# Patient Record
Sex: MALE | Race: WHITE | NOT HISPANIC OR LATINO | Employment: FULL TIME | URBAN - METROPOLITAN AREA
[De-identification: names, ages, dates, MRNs, and addresses within clinical notes are randomized per-mention and may not be internally consistent; named-entity substitution may affect disease eponyms.]

---

## 2017-06-09 ENCOUNTER — HOSPITAL ENCOUNTER (EMERGENCY)
Facility: CLINIC | Age: 62
Discharge: ANOTHER HOSPITAL | End: 2017-06-10
Attending: EMERGENCY MEDICINE | Admitting: EMERGENCY MEDICINE
Payer: COMMERCIAL

## 2017-06-09 DIAGNOSIS — I21.4 NSTEMI (NON-ST ELEVATED MYOCARDIAL INFARCTION) (H): ICD-10-CM

## 2017-06-09 LAB
ALBUMIN SERPL-MCNC: 3.3 G/DL (ref 3.4–5)
ALP SERPL-CCNC: 82 U/L (ref 40–150)
ALT SERPL W P-5'-P-CCNC: 20 U/L (ref 0–70)
ANION GAP SERPL CALCULATED.3IONS-SCNC: 10 MMOL/L (ref 3–14)
AST SERPL W P-5'-P-CCNC: 18 U/L (ref 0–45)
BASOPHILS # BLD AUTO: 0 10E9/L (ref 0–0.2)
BASOPHILS NFR BLD AUTO: 0.6 %
BILIRUB SERPL-MCNC: 0.7 MG/DL (ref 0.2–1.3)
BUN SERPL-MCNC: 33 MG/DL (ref 7–30)
CALCIUM SERPL-MCNC: 8.3 MG/DL (ref 8.5–10.1)
CHLORIDE SERPL-SCNC: 109 MMOL/L (ref 94–109)
CO2 SERPL-SCNC: 23 MMOL/L (ref 20–32)
CREAT SERPL-MCNC: 0.96 MG/DL (ref 0.66–1.25)
DIFFERENTIAL METHOD BLD: NORMAL
EOSINOPHIL # BLD AUTO: 0.4 10E9/L (ref 0–0.7)
EOSINOPHIL NFR BLD AUTO: 7.8 %
ERYTHROCYTE [DISTWIDTH] IN BLOOD BY AUTOMATED COUNT: 12.5 % (ref 10–15)
GFR SERPL CREATININE-BSD FRML MDRD: 80 ML/MIN/1.7M2
GLUCOSE SERPL-MCNC: 115 MG/DL (ref 70–99)
HCT VFR BLD AUTO: 40.1 % (ref 40–53)
HGB BLD-MCNC: 13.7 G/DL (ref 13.3–17.7)
IMM GRANULOCYTES # BLD: 0 10E9/L (ref 0–0.4)
IMM GRANULOCYTES NFR BLD: 0 %
LYMPHOCYTES # BLD AUTO: 2.2 10E9/L (ref 0.8–5.3)
LYMPHOCYTES NFR BLD AUTO: 41 %
MCH RBC QN AUTO: 30.4 PG (ref 26.5–33)
MCHC RBC AUTO-ENTMCNC: 34.2 G/DL (ref 31.5–36.5)
MCV RBC AUTO: 89 FL (ref 78–100)
MONOCYTES # BLD AUTO: 0.6 10E9/L (ref 0–1.3)
MONOCYTES NFR BLD AUTO: 11 %
NEUTROPHILS # BLD AUTO: 2.1 10E9/L (ref 1.6–8.3)
NEUTROPHILS NFR BLD AUTO: 39.6 %
PLATELET # BLD AUTO: 169 10E9/L (ref 150–450)
POTASSIUM SERPL-SCNC: 4.1 MMOL/L (ref 3.4–5.3)
PROT SERPL-MCNC: 6.9 G/DL (ref 6.8–8.8)
RBC # BLD AUTO: 4.51 10E12/L (ref 4.4–5.9)
SODIUM SERPL-SCNC: 142 MMOL/L (ref 133–144)
TROPONIN I SERPL-MCNC: 0.03 UG/L (ref 0–0.04)
WBC # BLD AUTO: 5.3 10E9/L (ref 4–11)

## 2017-06-09 PROCEDURE — 93005 ELECTROCARDIOGRAM TRACING: CPT | Mod: 76

## 2017-06-09 PROCEDURE — 93005 ELECTROCARDIOGRAM TRACING: CPT

## 2017-06-09 PROCEDURE — 96365 THER/PROPH/DIAG IV INF INIT: CPT

## 2017-06-09 PROCEDURE — 99285 EMERGENCY DEPT VISIT HI MDM: CPT

## 2017-06-09 PROCEDURE — 80053 COMPREHEN METABOLIC PANEL: CPT | Performed by: EMERGENCY MEDICINE

## 2017-06-09 PROCEDURE — 99285 EMERGENCY DEPT VISIT HI MDM: CPT | Mod: 25 | Performed by: EMERGENCY MEDICINE

## 2017-06-09 PROCEDURE — 25000132 ZZH RX MED GY IP 250 OP 250 PS 637: Performed by: EMERGENCY MEDICINE

## 2017-06-09 PROCEDURE — 93010 ELECTROCARDIOGRAM REPORT: CPT | Performed by: EMERGENCY MEDICINE

## 2017-06-09 PROCEDURE — 85025 COMPLETE CBC W/AUTO DIFF WBC: CPT | Performed by: EMERGENCY MEDICINE

## 2017-06-09 PROCEDURE — 84484 ASSAY OF TROPONIN QUANT: CPT | Performed by: EMERGENCY MEDICINE

## 2017-06-09 RX ORDER — ASPIRIN 81 MG/1
324 TABLET, CHEWABLE ORAL ONCE
Status: COMPLETED | OUTPATIENT
Start: 2017-06-09 | End: 2017-06-09

## 2017-06-09 RX ADMIN — ASPIRIN 81 MG 324 MG: 81 TABLET ORAL at 23:01

## 2017-06-10 ENCOUNTER — HOSPITAL ENCOUNTER (INPATIENT)
Facility: CLINIC | Age: 62
LOS: 1 days | Discharge: HOME | DRG: 247 | End: 2017-06-10
Attending: INTERNAL MEDICINE | Admitting: INTERNAL MEDICINE
Payer: COMMERCIAL

## 2017-06-10 ENCOUNTER — APPOINTMENT (OUTPATIENT)
Dept: CARDIOLOGY | Facility: CLINIC | Age: 62
DRG: 247 | End: 2017-06-10
Payer: COMMERCIAL

## 2017-06-10 VITALS
HEART RATE: 72 BPM | RESPIRATION RATE: 17 BRPM | OXYGEN SATURATION: 94 % | DIASTOLIC BLOOD PRESSURE: 77 MMHG | SYSTOLIC BLOOD PRESSURE: 117 MMHG | HEIGHT: 72 IN | WEIGHT: 205 LBS | BODY MASS INDEX: 27.77 KG/M2

## 2017-06-10 VITALS
SYSTOLIC BLOOD PRESSURE: 106 MMHG | OXYGEN SATURATION: 98 % | HEIGHT: 72 IN | HEART RATE: 52 BPM | DIASTOLIC BLOOD PRESSURE: 77 MMHG | WEIGHT: 212 LBS | TEMPERATURE: 98 F | BODY MASS INDEX: 28.71 KG/M2 | RESPIRATION RATE: 16 BRPM

## 2017-06-10 DIAGNOSIS — I24.9 ACS (ACUTE CORONARY SYNDROME) (H): Primary | ICD-10-CM

## 2017-06-10 LAB
CHOLEST SERPL-MCNC: 191 MG/DL
HBA1C MFR BLD: 5.7 % (ref 4.3–6)
HDLC SERPL-MCNC: 50 MG/DL
HGB BLD-MCNC: 13.9 G/DL (ref 13.3–17.7)
KCT BLD-ACNC: 238 SEC (ref 105–167)
LDLC SERPL CALC-MCNC: 116 MG/DL
LMWH PPP CHRO-ACNC: 1.29 IU/ML
NONHDLC SERPL-MCNC: 141 MG/DL
TRIGL SERPL-MCNC: 124 MG/DL
TROPONIN I SERPL-MCNC: 0.1 UG/L (ref 0–0.04)
TROPONIN I SERPL-MCNC: 0.39 UG/L (ref 0–0.04)
TROPONIN I SERPL-MCNC: 0.52 UG/L (ref 0–0.04)

## 2017-06-10 PROCEDURE — 25000125 ZZHC RX 250: Performed by: INTERNAL MEDICINE

## 2017-06-10 PROCEDURE — 85347 COAGULATION TIME ACTIVATED: CPT

## 2017-06-10 PROCEDURE — 80061 LIPID PANEL: CPT | Performed by: INTERNAL MEDICINE

## 2017-06-10 PROCEDURE — 83036 HEMOGLOBIN GLYCOSYLATED A1C: CPT | Performed by: INTERNAL MEDICINE

## 2017-06-10 PROCEDURE — 93005 ELECTROCARDIOGRAM TRACING: CPT

## 2017-06-10 PROCEDURE — 96365 THER/PROPH/DIAG IV INF INIT: CPT

## 2017-06-10 PROCEDURE — 25000132 ZZH RX MED GY IP 250 OP 250 PS 637

## 2017-06-10 PROCEDURE — 36415 COLL VENOUS BLD VENIPUNCTURE: CPT | Performed by: INTERNAL MEDICINE

## 2017-06-10 PROCEDURE — 85520 HEPARIN ASSAY: CPT | Performed by: INTERNAL MEDICINE

## 2017-06-10 PROCEDURE — 27210946 ZZH KIT HC TOTES DISP CR8

## 2017-06-10 PROCEDURE — C9600 PERC DRUG-EL COR STENT SING: HCPCS

## 2017-06-10 PROCEDURE — 27210998 ZZH ACCESS HEART CATH CR6

## 2017-06-10 PROCEDURE — 85018 HEMOGLOBIN: CPT

## 2017-06-10 PROCEDURE — 25000128 H RX IP 250 OP 636: Performed by: INTERNAL MEDICINE

## 2017-06-10 PROCEDURE — 84484 ASSAY OF TROPONIN QUANT: CPT | Performed by: INTERNAL MEDICINE

## 2017-06-10 PROCEDURE — 25000132 ZZH RX MED GY IP 250 OP 250 PS 637: Performed by: INTERNAL MEDICINE

## 2017-06-10 PROCEDURE — 36415 COLL VENOUS BLD VENIPUNCTURE: CPT

## 2017-06-10 PROCEDURE — C1874 STENT, COATED/COV W/DEL SYS: HCPCS

## 2017-06-10 PROCEDURE — 84484 ASSAY OF TROPONIN QUANT: CPT | Performed by: EMERGENCY MEDICINE

## 2017-06-10 PROCEDURE — 21400006 ZZH R&B CCU INTERMEDIATE UMMC

## 2017-06-10 PROCEDURE — 92941 PRQ TRLML REVSC TOT OCCL AMI: CPT | Mod: LD | Performed by: INTERNAL MEDICINE

## 2017-06-10 PROCEDURE — 93454 CORONARY ARTERY ANGIO S&I: CPT | Mod: 26 | Performed by: INTERNAL MEDICINE

## 2017-06-10 PROCEDURE — 27210787 ZZH MANIFOLD CR2

## 2017-06-10 PROCEDURE — C1894 INTRO/SHEATH, NON-LASER: HCPCS

## 2017-06-10 PROCEDURE — C1760 CLOSURE DEV, VASC: HCPCS

## 2017-06-10 PROCEDURE — 99223 1ST HOSP IP/OBS HIGH 75: CPT | Mod: AI | Performed by: INTERNAL MEDICINE

## 2017-06-10 PROCEDURE — 27210760 ZZH DEVICE INFLATION CR7

## 2017-06-10 PROCEDURE — 027034Z DILATION OF CORONARY ARTERY, ONE ARTERY WITH DRUG-ELUTING INTRALUMINAL DEVICE, PERCUTANEOUS APPROACH: ICD-10-PCS | Performed by: INTERNAL MEDICINE

## 2017-06-10 PROCEDURE — 27210742 ZZH CATH CR1

## 2017-06-10 PROCEDURE — C1887 CATHETER, GUIDING: HCPCS

## 2017-06-10 PROCEDURE — 93010 ELECTROCARDIOGRAM REPORT: CPT | Performed by: INTERNAL MEDICINE

## 2017-06-10 PROCEDURE — 25000128 H RX IP 250 OP 636: Performed by: EMERGENCY MEDICINE

## 2017-06-10 PROCEDURE — 99152 MOD SED SAME PHYS/QHP 5/>YRS: CPT

## 2017-06-10 PROCEDURE — C1769 GUIDE WIRE: HCPCS

## 2017-06-10 PROCEDURE — 93454 CORONARY ARTERY ANGIO S&I: CPT

## 2017-06-10 PROCEDURE — 27211089 ZZH KIT ACIST INJECTOR CR3

## 2017-06-10 RX ORDER — ADENOSINE 3 MG/ML
12-12000 INJECTION, SOLUTION INTRAVENOUS
Status: DISCONTINUED | OUTPATIENT
Start: 2017-06-10 | End: 2017-06-10 | Stop reason: HOSPADM

## 2017-06-10 RX ORDER — PRASUGREL 10 MG/1
10-60 TABLET, FILM COATED ORAL
Status: DISCONTINUED | OUTPATIENT
Start: 2017-06-10 | End: 2017-06-10 | Stop reason: HOSPADM

## 2017-06-10 RX ORDER — NIFEDIPINE 10 MG/1
10 CAPSULE ORAL
Status: DISCONTINUED | OUTPATIENT
Start: 2017-06-10 | End: 2017-06-10 | Stop reason: HOSPADM

## 2017-06-10 RX ORDER — NICARDIPINE HYDROCHLORIDE 2.5 MG/ML
100 INJECTION INTRAVENOUS
Status: DISCONTINUED | OUTPATIENT
Start: 2017-06-10 | End: 2017-06-10 | Stop reason: HOSPADM

## 2017-06-10 RX ORDER — PROTAMINE SULFATE 10 MG/ML
1-5 INJECTION, SOLUTION INTRAVENOUS
Status: DISCONTINUED | OUTPATIENT
Start: 2017-06-10 | End: 2017-06-10 | Stop reason: HOSPADM

## 2017-06-10 RX ORDER — ACETAMINOPHEN 325 MG/1
650 TABLET ORAL EVERY 4 HOURS PRN
Status: DISCONTINUED | OUTPATIENT
Start: 2017-06-10 | End: 2017-06-10 | Stop reason: HOSPADM

## 2017-06-10 RX ORDER — ALUMINA, MAGNESIA, AND SIMETHICONE 2400; 2400; 240 MG/30ML; MG/30ML; MG/30ML
15-30 SUSPENSION ORAL EVERY 4 HOURS PRN
Status: DISCONTINUED | OUTPATIENT
Start: 2017-06-10 | End: 2017-06-10 | Stop reason: HOSPADM

## 2017-06-10 RX ORDER — FUROSEMIDE 10 MG/ML
20-100 INJECTION INTRAMUSCULAR; INTRAVENOUS
Status: DISCONTINUED | OUTPATIENT
Start: 2017-06-10 | End: 2017-06-10 | Stop reason: HOSPADM

## 2017-06-10 RX ORDER — ONDANSETRON 2 MG/ML
4 INJECTION INTRAMUSCULAR; INTRAVENOUS EVERY 4 HOURS PRN
Status: DISCONTINUED | OUTPATIENT
Start: 2017-06-10 | End: 2017-06-10 | Stop reason: HOSPADM

## 2017-06-10 RX ORDER — METOPROLOL TARTRATE 1 MG/ML
5 INJECTION, SOLUTION INTRAVENOUS EVERY 5 MIN PRN
Status: DISCONTINUED | OUTPATIENT
Start: 2017-06-10 | End: 2017-06-10 | Stop reason: HOSPADM

## 2017-06-10 RX ORDER — HYDRALAZINE HYDROCHLORIDE 20 MG/ML
10-20 INJECTION INTRAMUSCULAR; INTRAVENOUS
Status: DISCONTINUED | OUTPATIENT
Start: 2017-06-10 | End: 2017-06-10 | Stop reason: HOSPADM

## 2017-06-10 RX ORDER — PROMETHAZINE HYDROCHLORIDE 25 MG/ML
6.25-25 INJECTION, SOLUTION INTRAMUSCULAR; INTRAVENOUS EVERY 4 HOURS PRN
Status: DISCONTINUED | OUTPATIENT
Start: 2017-06-10 | End: 2017-06-10 | Stop reason: HOSPADM

## 2017-06-10 RX ORDER — HEPARIN SODIUM 10000 [USP'U]/100ML
0-3500 INJECTION, SOLUTION INTRAVENOUS CONTINUOUS
Status: DISCONTINUED | OUTPATIENT
Start: 2017-06-10 | End: 2017-06-10

## 2017-06-10 RX ORDER — VERAPAMIL HYDROCHLORIDE 2.5 MG/ML
1-5 INJECTION, SOLUTION INTRAVENOUS
Status: DISCONTINUED | OUTPATIENT
Start: 2017-06-10 | End: 2017-06-10 | Stop reason: HOSPADM

## 2017-06-10 RX ORDER — PRAVASTATIN SODIUM 40 MG
40 TABLET ORAL EVERY EVENING
Status: DISCONTINUED | OUTPATIENT
Start: 2017-06-10 | End: 2017-06-10 | Stop reason: HOSPADM

## 2017-06-10 RX ORDER — EPTIFIBATIDE 2 MG/ML
180 INJECTION, SOLUTION INTRAVENOUS EVERY 10 MIN PRN
Status: DISCONTINUED | OUTPATIENT
Start: 2017-06-10 | End: 2017-06-10 | Stop reason: HOSPADM

## 2017-06-10 RX ORDER — HEPARIN SODIUM 1000 [USP'U]/ML
1000-10000 INJECTION, SOLUTION INTRAVENOUS; SUBCUTANEOUS EVERY 5 MIN PRN
Status: DISCONTINUED | OUTPATIENT
Start: 2017-06-10 | End: 2017-06-10 | Stop reason: HOSPADM

## 2017-06-10 RX ORDER — ARGATROBAN 1 MG/ML
150 INJECTION, SOLUTION INTRAVENOUS
Status: DISCONTINUED | OUTPATIENT
Start: 2017-06-10 | End: 2017-06-10 | Stop reason: HOSPADM

## 2017-06-10 RX ORDER — PROTAMINE SULFATE 10 MG/ML
25-100 INJECTION, SOLUTION INTRAVENOUS EVERY 5 MIN PRN
Status: DISCONTINUED | OUTPATIENT
Start: 2017-06-10 | End: 2017-06-10 | Stop reason: HOSPADM

## 2017-06-10 RX ORDER — FLUMAZENIL 0.1 MG/ML
0.2 INJECTION, SOLUTION INTRAVENOUS
Status: DISCONTINUED | OUTPATIENT
Start: 2017-06-10 | End: 2017-06-10 | Stop reason: HOSPADM

## 2017-06-10 RX ORDER — ASPIRIN 81 MG/1
81 TABLET, CHEWABLE ORAL DAILY
Qty: 36 TABLET | Refills: 3 | Status: SHIPPED | OUTPATIENT
Start: 2017-06-10 | End: 2017-06-10

## 2017-06-10 RX ORDER — FENTANYL CITRATE 50 UG/ML
25-50 INJECTION, SOLUTION INTRAMUSCULAR; INTRAVENOUS
Status: DISCONTINUED | OUTPATIENT
Start: 2017-06-10 | End: 2017-06-10 | Stop reason: HOSPADM

## 2017-06-10 RX ORDER — ENALAPRILAT 1.25 MG/ML
1.25-2.5 INJECTION INTRAVENOUS
Status: DISCONTINUED | OUTPATIENT
Start: 2017-06-10 | End: 2017-06-10 | Stop reason: HOSPADM

## 2017-06-10 RX ORDER — NALOXONE HYDROCHLORIDE 0.4 MG/ML
.1-.4 INJECTION, SOLUTION INTRAMUSCULAR; INTRAVENOUS; SUBCUTANEOUS
Status: DISCONTINUED | OUTPATIENT
Start: 2017-06-10 | End: 2017-06-10 | Stop reason: HOSPADM

## 2017-06-10 RX ORDER — METOPROLOL TARTRATE 25 MG/1
25 TABLET, FILM COATED ORAL 2 TIMES DAILY
Qty: 60 TABLET | Refills: 11 | Status: SHIPPED | OUTPATIENT
Start: 2017-06-10 | End: 2017-06-10

## 2017-06-10 RX ORDER — LISINOPRIL 10 MG/1
10 TABLET ORAL DAILY
Qty: 30 TABLET | Refills: 11 | Status: SHIPPED | OUTPATIENT
Start: 2017-06-10 | End: 2017-06-10

## 2017-06-10 RX ORDER — LISINOPRIL 10 MG/1
10 TABLET ORAL DAILY
Qty: 30 TABLET | Refills: 11 | Status: SHIPPED | OUTPATIENT
Start: 2017-06-10 | End: 2017-06-28

## 2017-06-10 RX ORDER — ASPIRIN 81 MG/1
81 TABLET, CHEWABLE ORAL DAILY
Qty: 36 TABLET | Refills: 3 | Status: SHIPPED | OUTPATIENT
Start: 2017-06-10 | End: 2017-07-06

## 2017-06-10 RX ORDER — ACETAMINOPHEN 650 MG/1
650 SUPPOSITORY RECTAL EVERY 4 HOURS PRN
Status: DISCONTINUED | OUTPATIENT
Start: 2017-06-10 | End: 2017-06-10 | Stop reason: HOSPADM

## 2017-06-10 RX ORDER — ASPIRIN 81 MG/1
81-324 TABLET, CHEWABLE ORAL
Status: DISCONTINUED | OUTPATIENT
Start: 2017-06-10 | End: 2017-06-10 | Stop reason: HOSPADM

## 2017-06-10 RX ORDER — METOPROLOL TARTRATE 25 MG/1
25 TABLET, FILM COATED ORAL 2 TIMES DAILY
Qty: 60 TABLET | Refills: 11 | Status: SHIPPED | OUTPATIENT
Start: 2017-06-10 | End: 2017-07-06

## 2017-06-10 RX ORDER — DOBUTAMINE HYDROCHLORIDE 200 MG/100ML
2-20 INJECTION INTRAVENOUS CONTINUOUS PRN
Status: DISCONTINUED | OUTPATIENT
Start: 2017-06-10 | End: 2017-06-10 | Stop reason: HOSPADM

## 2017-06-10 RX ORDER — DEXTROSE MONOHYDRATE 25 G/50ML
12.5-5 INJECTION, SOLUTION INTRAVENOUS EVERY 30 MIN PRN
Status: DISCONTINUED | OUTPATIENT
Start: 2017-06-10 | End: 2017-06-10 | Stop reason: HOSPADM

## 2017-06-10 RX ORDER — ARGATROBAN 1 MG/ML
350 INJECTION, SOLUTION INTRAVENOUS
Status: DISCONTINUED | OUTPATIENT
Start: 2017-06-10 | End: 2017-06-10 | Stop reason: HOSPADM

## 2017-06-10 RX ORDER — LIDOCAINE HYDROCHLORIDE 10 MG/ML
30 INJECTION, SOLUTION EPIDURAL; INFILTRATION; INTRACAUDAL; PERINEURAL
Status: DISCONTINUED | OUTPATIENT
Start: 2017-06-10 | End: 2017-06-10 | Stop reason: HOSPADM

## 2017-06-10 RX ORDER — CLOPIDOGREL BISULFATE 75 MG/1
300-600 TABLET ORAL
Status: DISCONTINUED | OUTPATIENT
Start: 2017-06-10 | End: 2017-06-10 | Stop reason: HOSPADM

## 2017-06-10 RX ORDER — NITROGLYCERIN 20 MG/100ML
.07-2 INJECTION INTRAVENOUS CONTINUOUS PRN
Status: DISCONTINUED | OUTPATIENT
Start: 2017-06-10 | End: 2017-06-10 | Stop reason: HOSPADM

## 2017-06-10 RX ORDER — EPTIFIBATIDE 2 MG/ML
2 INJECTION, SOLUTION INTRAVENOUS CONTINUOUS PRN
Status: DISCONTINUED | OUTPATIENT
Start: 2017-06-10 | End: 2017-06-10 | Stop reason: HOSPADM

## 2017-06-10 RX ORDER — IOPAMIDOL 755 MG/ML
65 INJECTION, SOLUTION INTRAVASCULAR ONCE
Status: COMPLETED | OUTPATIENT
Start: 2017-06-10 | End: 2017-06-10

## 2017-06-10 RX ORDER — METOPROLOL TARTRATE 25 MG/1
25 TABLET, FILM COATED ORAL 2 TIMES DAILY
Status: DISCONTINUED | OUTPATIENT
Start: 2017-06-10 | End: 2017-06-10 | Stop reason: HOSPADM

## 2017-06-10 RX ORDER — PRAVASTATIN SODIUM 40 MG
40 TABLET ORAL EVERY EVENING
Qty: 30 TABLET | Refills: 11 | Status: SHIPPED | OUTPATIENT
Start: 2017-06-10 | End: 2017-07-06

## 2017-06-10 RX ORDER — PHENYLEPHRINE HCL IN 0.9% NACL 1 MG/10 ML
20-100 SYRINGE (ML) INTRAVENOUS
Status: DISCONTINUED | OUTPATIENT
Start: 2017-06-10 | End: 2017-06-10 | Stop reason: HOSPADM

## 2017-06-10 RX ORDER — PRAVASTATIN SODIUM 40 MG
40 TABLET ORAL EVERY EVENING
Qty: 30 TABLET | Refills: 11 | Status: SHIPPED | OUTPATIENT
Start: 2017-06-10 | End: 2017-06-10

## 2017-06-10 RX ORDER — ASPIRIN 325 MG
325 TABLET ORAL
Status: DISCONTINUED | OUTPATIENT
Start: 2017-06-10 | End: 2017-06-10 | Stop reason: HOSPADM

## 2017-06-10 RX ORDER — MAGNESIUM HYDROXIDE 1200 MG/15ML
1000 LIQUID ORAL CONTINUOUS PRN
Status: DISCONTINUED | OUTPATIENT
Start: 2017-06-10 | End: 2017-06-10 | Stop reason: HOSPADM

## 2017-06-10 RX ORDER — NITROGLYCERIN 0.4 MG/1
0.4 TABLET SUBLINGUAL EVERY 5 MIN PRN
Status: DISCONTINUED | OUTPATIENT
Start: 2017-06-10 | End: 2017-06-10 | Stop reason: HOSPADM

## 2017-06-10 RX ORDER — ATROPINE SULFATE 0.1 MG/ML
.5-1 INJECTION INTRAVENOUS
Status: DISCONTINUED | OUTPATIENT
Start: 2017-06-10 | End: 2017-06-10 | Stop reason: HOSPADM

## 2017-06-10 RX ORDER — LIDOCAINE 40 MG/G
CREAM TOPICAL
Status: DISCONTINUED | OUTPATIENT
Start: 2017-06-10 | End: 2017-06-10 | Stop reason: HOSPADM

## 2017-06-10 RX ORDER — SODIUM CHLORIDE 9 MG/ML
INJECTION, SOLUTION INTRAVENOUS CONTINUOUS
Status: DISCONTINUED | OUTPATIENT
Start: 2017-06-10 | End: 2017-06-10 | Stop reason: HOSPADM

## 2017-06-10 RX ORDER — NITROGLYCERIN 5 MG/ML
100-200 VIAL (ML) INTRAVENOUS
Status: DISCONTINUED | OUTPATIENT
Start: 2017-06-10 | End: 2017-06-10 | Stop reason: HOSPADM

## 2017-06-10 RX ORDER — POTASSIUM CHLORIDE 29.8 MG/ML
20 INJECTION INTRAVENOUS
Status: DISCONTINUED | OUTPATIENT
Start: 2017-06-10 | End: 2017-06-10 | Stop reason: HOSPADM

## 2017-06-10 RX ORDER — DIPHENHYDRAMINE HYDROCHLORIDE 50 MG/ML
25-50 INJECTION INTRAMUSCULAR; INTRAVENOUS
Status: DISCONTINUED | OUTPATIENT
Start: 2017-06-10 | End: 2017-06-10 | Stop reason: HOSPADM

## 2017-06-10 RX ORDER — DOPAMINE HYDROCHLORIDE 160 MG/100ML
2-20 INJECTION, SOLUTION INTRAVENOUS CONTINUOUS PRN
Status: DISCONTINUED | OUTPATIENT
Start: 2017-06-10 | End: 2017-06-10 | Stop reason: HOSPADM

## 2017-06-10 RX ORDER — LORAZEPAM 2 MG/ML
.5-2 INJECTION INTRAMUSCULAR EVERY 4 HOURS PRN
Status: DISCONTINUED | OUTPATIENT
Start: 2017-06-10 | End: 2017-06-10 | Stop reason: HOSPADM

## 2017-06-10 RX ORDER — NALOXONE HYDROCHLORIDE 0.4 MG/ML
0.4 INJECTION, SOLUTION INTRAMUSCULAR; INTRAVENOUS; SUBCUTANEOUS EVERY 5 MIN PRN
Status: DISCONTINUED | OUTPATIENT
Start: 2017-06-10 | End: 2017-06-10 | Stop reason: HOSPADM

## 2017-06-10 RX ORDER — NITROGLYCERIN 5 MG/ML
100-500 VIAL (ML) INTRAVENOUS
Status: DISCONTINUED | OUTPATIENT
Start: 2017-06-10 | End: 2017-06-10 | Stop reason: HOSPADM

## 2017-06-10 RX ORDER — POTASSIUM CHLORIDE 7.45 MG/ML
10 INJECTION INTRAVENOUS
Status: DISCONTINUED | OUTPATIENT
Start: 2017-06-10 | End: 2017-06-10 | Stop reason: HOSPADM

## 2017-06-10 RX ORDER — METHYLPREDNISOLONE SODIUM SUCCINATE 125 MG/2ML
125 INJECTION, POWDER, LYOPHILIZED, FOR SOLUTION INTRAMUSCULAR; INTRAVENOUS
Status: DISCONTINUED | OUTPATIENT
Start: 2017-06-10 | End: 2017-06-10 | Stop reason: HOSPADM

## 2017-06-10 RX ORDER — ASPIRIN 81 MG/1
81 TABLET, CHEWABLE ORAL DAILY
Status: DISCONTINUED | OUTPATIENT
Start: 2017-06-10 | End: 2017-06-10 | Stop reason: HOSPADM

## 2017-06-10 RX ORDER — LISINOPRIL 10 MG/1
10 TABLET ORAL DAILY
Status: DISCONTINUED | OUTPATIENT
Start: 2017-06-10 | End: 2017-06-10 | Stop reason: HOSPADM

## 2017-06-10 RX ORDER — CLOPIDOGREL BISULFATE 75 MG/1
75 TABLET ORAL
Status: DISCONTINUED | OUTPATIENT
Start: 2017-06-10 | End: 2017-06-10 | Stop reason: HOSPADM

## 2017-06-10 RX ORDER — SODIUM NITROPRUSSIDE 25 MG/ML
100-200 INJECTION INTRAVENOUS
Status: DISCONTINUED | OUTPATIENT
Start: 2017-06-10 | End: 2017-06-10 | Stop reason: HOSPADM

## 2017-06-10 RX ADMIN — HEPARIN SODIUM 12 UNITS/KG/HR: 10000 INJECTION, SOLUTION INTRAVENOUS at 02:19

## 2017-06-10 RX ADMIN — TICAGRELOR 90 MG: 90 TABLET ORAL at 18:56

## 2017-06-10 RX ADMIN — BACLOFEN 324 MG: 10 TABLET ORAL at 11:04

## 2017-06-10 RX ADMIN — Medication 500 UNITS: at 11:30

## 2017-06-10 RX ADMIN — NITROGLYCERIN 200 MCG: 5 INJECTION, SOLUTION INTRAVENOUS at 11:48

## 2017-06-10 RX ADMIN — RANITIDINE HYDROCHLORIDE 150 MG: 150 TABLET, FILM COATED ORAL at 09:02

## 2017-06-10 RX ADMIN — MIDAZOLAM HYDROCHLORIDE 0.5 MG: 1 INJECTION, SOLUTION INTRAMUSCULAR; INTRAVENOUS at 11:40

## 2017-06-10 RX ADMIN — IOPAMIDOL 65 ML: 755 INJECTION, SOLUTION INTRAVASCULAR at 12:00

## 2017-06-10 RX ADMIN — TICAGRELOR 180 MG: 90 TABLET ORAL at 11:52

## 2017-06-10 RX ADMIN — NICARDIPINE HYDROCHLORIDE 300 MCG: 2.5 INJECTION INTRAVENOUS at 11:47

## 2017-06-10 RX ADMIN — FENTANYL CITRATE 50 MCG: 50 INJECTION, SOLUTION INTRAMUSCULAR; INTRAVENOUS at 11:29

## 2017-06-10 RX ADMIN — HEPARIN SODIUM 1150 UNITS/HR: 10000 INJECTION, SOLUTION INTRAVENOUS at 04:50

## 2017-06-10 RX ADMIN — LISINOPRIL 10 MG: 10 TABLET ORAL at 09:03

## 2017-06-10 RX ADMIN — Medication 5000 UNITS: at 02:18

## 2017-06-10 RX ADMIN — HEPARIN SODIUM 7000 UNITS: 1000 INJECTION, SOLUTION INTRAVENOUS; SUBCUTANEOUS at 11:41

## 2017-06-10 RX ADMIN — Medication 1500 UNITS: at 11:30

## 2017-06-10 RX ADMIN — ACETAMINOPHEN 650 MG: 325 TABLET, FILM COATED ORAL at 15:28

## 2017-06-10 RX ADMIN — METOPROLOL TARTRATE 25 MG: 25 TABLET, FILM COATED ORAL at 09:03

## 2017-06-10 RX ADMIN — MIDAZOLAM HYDROCHLORIDE 1 MG: 1 INJECTION, SOLUTION INTRAMUSCULAR; INTRAVENOUS at 11:30

## 2017-06-10 RX ADMIN — ASPIRIN 81 MG 81 MG: 81 TABLET ORAL at 09:03

## 2017-06-10 RX ADMIN — SODIUM CHLORIDE: 9 INJECTION, SOLUTION INTRAVENOUS at 04:49

## 2017-06-10 ASSESSMENT — ENCOUNTER SYMPTOMS
COUGH: 0
SHORTNESS OF BREATH: 0
CHILLS: 0
NAUSEA: 1
LIGHT-HEADEDNESS: 0
PALPITATIONS: 0
VOMITING: 0
ABDOMINAL PAIN: 0
FEVER: 0
APPETITE CHANGE: 0
HEADACHES: 0
DIAPHORESIS: 1

## 2017-06-10 ASSESSMENT — PAIN DESCRIPTION - DESCRIPTORS: DESCRIPTORS: ACHING

## 2017-06-10 NOTE — ED NOTES
"Pt reports that he went to bed and had sudden substernal CP at approximately 2145. Pt reports pain subsided after a few minutes but then left arm went numb. Pt reports he went to take a shower and arm felt slightly better. Pt was ambulating back to the bedroom and \"just went down to his knees\". No LOC. Pt c/o nausea at that time. Symptoms began to subside and pt wanted to go to bed but wife brought him to ED.  "

## 2017-06-10 NOTE — IP AVS SNAPSHOT
[address] [address]    Phone:  [phone]                                       After Visit Summary   6/10/2017    Hai Redding    MRN: [id number]           After Visit Summary Signature Page     I have received my discharge instructions, and my questions have been answered. I have discussed any challenges I see with this plan with the nurse or doctor.    ..........................................................................................................................................  Patient/Patient Representative Signature      ..........................................................................................................................................  Patient Representative Print Name and Relationship to Patient    ..................................................               ................................................  Date                                            Time    ..........................................................................................................................................  Reviewed by Signature/Title    ...................................................              ..............................................  Date                                                            Time

## 2017-06-10 NOTE — PROVIDER NOTIFICATION
Pt had a small bleed from right groin site. Old Tegaderm dressing removed and manual pressure applied. MD at bedside holding pressure for approximately 15 minutes. VSS. Denies lightheadedness or dizziness. Stat Hgb ordered. CMS intact. No bruising or hematoma at groin site. Pt will be on bedrest again for the next 2 hrs until 4:30pm. Pt said his breathing feels funny and that he woke up from his sleep just to catch his breath. His sat was 97% on RA, so O2 2L NC placed for comfort. MD's informed.

## 2017-06-10 NOTE — IP AVS SNAPSHOT
MRN:[id number]                      After Visit Summary   6/10/2017    Hai Redding    MRN: [id number]           Thank you!     Thank you for choosing [facility] for your care. Our goal is always to provide you with excellent care. Hearing back from our patients is one way we can continue to improve our services. Please take a few minutes to complete the written survey that you may receive in the mail after you visit with us. Thank you!        Patient Information     Date Of Birth          1955        Designated Caregiver       Most Recent Value    Caregiver    Will someone help with your care after discharge? no    Name of designated caregiver Leni    Phone number of caregiver [phone]    Caregiver address [place]      About your hospital stay     You were admitted on:  Nikole 10, 2017 You last received care in the:  [address] [facility]    You were discharged on:  Nikole 10, 2017        Reason for your hospital stay       Coronary artery disease, underwent stent placement                  Who to Call     For medical emergencies, please call 911.  For non-urgent questions about your medical care, please call your primary care provider or clinic, [phone]          Attending Provider     Provider Specialty    Ramiro Maldonado MD Cardiology       Primary Care Provider Office Phone # Fax #    [facility] [phone]      After Care Instructions     Activity       Your activity upon discharge: activity as tolerated            Diet       Follow this diet upon discharge: Cardiac                  Follow-up Appointments     [facility] Follow-up and recommended labs and tests       Follow up with primary care provider, [facility], within 7 days for hospital follow- up.      Please also set up an appointment with a Cardiologist within the next 2-3 weeks. Call [phone] to schedule the appointment.     Appointments on [place] and/or  "[facility] (with [facility] or [facility] provider or service). Call [phone] if you haven't heard regarding these appointments within 7 days of discharge.                  Pending Results     Date and Time Order Name Status Description    6/10/2017 0440 EKG 12-lead, complete Preliminary     2017 2221 EKG 12-lead, tracing only In process             Statement of Approval     Ordered          06/10/17 1832  I have reviewed and agree with all the recommendations and orders detailed in this document.  EFFECTIVE NOW     Approved and electronically signed by:  Darvin Anguiano MD             Admission Information     Date & Time Provider Department Dept. Phone    6/10/2017 Ramiro Maldonado MD [address] [facility] [phone]      Your Vitals Were     Blood Pressure Pulse Temperature Respirations Height Weight    106/77 52 98  F (36.7  C) (Oral) 16 1.829 m (6') 96.2 kg (212 lb)    Pulse Oximetry BMI (Body Mass Index)                98% 28.75 kg/m2          [facility] Information     [facility] lets you send messages to your doctor, view your test results, renew your prescriptions, schedule appointments and more. To sign up, go to [email or web address] . Click on \"Log in\" on the left side of the screen, which will take you to the Welcome page. Then click on \"Sign up Now\" on the right side of the page.     You will be asked to enter the access code listed below, as well as some personal information. Please follow the directions to create your username and password.     Your access code is: [email or web address]  Expires: 2017  6:50 PM     Your access code will  in 90 days. If you need help or a new code, please call your [facility] clinic or [phone].        Care EveryWhere ID     This is your Care EveryWhere ID. This could be used by other organizations to access your [facility] medical records  [id number]           Review of your medicines      START taking        Dose / Directions    aspirin 81 MG chewable " tablet        Dose:  81 mg   Take 1 tablet (81 mg) by mouth daily   Quantity:  36 tablet   Refills:  3       lisinopril 10 MG tablet   Commonly known as:  PRINIVIL/ZESTRIL        Dose:  10 mg   Take 1 tablet (10 mg) by mouth daily   Quantity:  30 tablet   Refills:  11       metoprolol 25 MG tablet   Commonly known as:  LOPRESSOR        Dose:  25 mg   Take 1 tablet (25 mg) by mouth 2 times daily   Quantity:  60 tablet   Refills:  11       pravastatin 40 MG tablet   Commonly known as:  PRAVACHOL        Dose:  40 mg   Take 1 tablet (40 mg) by mouth every evening   Quantity:  30 tablet   Refills:  11       ranitidine 150 MG tablet   Commonly known as:  ZANTAC        Dose:  150 mg   Take 1 tablet (150 mg) by mouth 2 times daily   Quantity:  60 tablet   Refills:  3       ticagrelor 90 MG tablet   Commonly known as:  BRILINTA        Dose:  90 mg   Take 1 tablet (90 mg) by mouth 2 times daily   Quantity:  60 tablet   Refills:  11         CONTINUE these medicines which have NOT CHANGED        Dose / Directions    TYLENOL 325 MG tablet   Generic drug:  acetaminophen        1-2 TABLETS ORALLY EVERY 4 HOURS AS NEEDED, DO NOT EXCEED 4,000 MG OF ACETAMINOPHEN IN 24 HOURS   Refills:  0         STOP taking     ibuprofen 200 MG tablet   Commonly known as:  ADVIL/MOTRIN                Where to get your medicines      These medications were sent to [id number] - [place], [place] - [address] AT [address] [address] [address] [address] [address], [place] [address]    Hours:  24-hours Phone:  [phone]     aspirin 81 MG chewable tablet    lisinopril 10 MG tablet    metoprolol 25 MG tablet    pravastatin 40 MG tablet    ranitidine 150 MG tablet    ticagrelor 90 MG tablet                Protect others around you: Learn how to safely use, store and throw away your medicines at www.disposemymeds.org.             Medication List: This is a list of all your medications and when to take them. Check marks below  indicate your daily home schedule. Keep this list as a reference.      Medications           Morning Afternoon Evening Bedtime As Needed    aspirin 81 MG chewable tablet   Take 1 tablet (81 mg) by mouth daily   Last time this was given:  81 mg on 6/10/2017  9:03 AM                                lisinopril 10 MG tablet   Commonly known as:  PRINIVIL/ZESTRIL   Take 1 tablet (10 mg) by mouth daily   Last time this was given:  10 mg on 6/10/2017  9:03 AM                                metoprolol 25 MG tablet   Commonly known as:  LOPRESSOR   Take 1 tablet (25 mg) by mouth 2 times daily   Last time this was given:  25 mg on 6/10/2017  9:03 AM                                pravastatin 40 MG tablet   Commonly known as:  PRAVACHOL   Take 1 tablet (40 mg) by mouth every evening                                ranitidine 150 MG tablet   Commonly known as:  ZANTAC   Take 1 tablet (150 mg) by mouth 2 times daily   Last time this was given:  150 mg on 6/10/2017  9:02 AM                                ticagrelor 90 MG tablet   Commonly known as:  BRILINTA   Take 1 tablet (90 mg) by mouth 2 times daily   Last time this was given:  180 mg on 6/10/2017 11:52 AM                                TYLENOL 325 MG tablet   1-2 TABLETS ORALLY EVERY 4 HOURS AS NEEDED, DO NOT EXCEED 4,000 MG OF ACETAMINOPHEN IN 24 HOURS   Last time this was given:  650 mg on 6/10/2017  3:28 PM   Generic drug:  acetaminophen

## 2017-06-10 NOTE — PROGRESS NOTES
D/I/A: Pt admitted from [facility] with CP.  Pt stable upon arrival-heparin infusing at 1000 units via PIV per EMS.  VSS.  1st degree block.  Pt oriented to unit and routines.  Clothes and shoes for belongings.    P:  Pt NPO for possible procedures-trend trop.  Continue to monitor and update md's with changes/concerns.

## 2017-06-10 NOTE — PROGRESS NOTES
/80 (BP Location: Left arm)  Temp 97.8  F (36.6  C) (Oral)  Resp 18  Ht 1.829 m (6')  Wt 96.2 kg (212 lb)  SpO2 95%  BMI 28.75 kg/m2       VSS. Sinus ann. RA. Denies CP or SOB. Pt left 6C via litter for Angiogram at 10:55am. Heparin drip stopped at that time per cath lab requests.

## 2017-06-10 NOTE — ED PROVIDER NOTES
History     Chief Complaint   Patient presents with     Chest Pain     Pt chest pain and left arm numbness - states episode happened and patient became 'gray/green' per wife - states 'he collapsed to his knees' - denies any loss of consciousness.     HPI  Hai Redding is a 62 year old male with no significant diagnosis has been medical history who presents for evaluation of an episode of chest pressure tonight.  Patient states he was laying in bed getting ready for sleep when he developed sudden onset of severe central chest pressure similar to a weight sitting on his chest and tingling sensation running down his entire left arm.  Patient states this was associated with some sweatiness and nausea.  Patient stood up to go to shower thinking this would help his symptoms.  He showered for about 4 minutes and his pressure intensified after getting out of the shower causing him to drop to his knees.  The patient states that shortly after this happened, his pain subsided and has not returned.  Patient reports he is regularly active and exercises and has never had similar episodes while exercising.  Denies recent exertional dyspnea.  No recent exertional symptoms at all.  Patient denies history of hypertension, hyperlipidemia, or coronary artery disease.  No family history of early coronary artery disease.  Patient states he is a former smoker but quit more than 20 years ago.    I have reviewed the Medications, Allergies, Past Medical and Surgical History, and Social History in the Epic system.    Allergies:   Allergies   Allergen Reactions     Shellfish Allergy          No current facility-administered medications on file prior to encounter.   Current Outpatient Prescriptions on File Prior to Encounter:  IBUPROFEN 200 MG OR TABS 1 TABLET ORALLY EVERY 6 HOURS AS NEEDED FOR PAIN, TAKE WITH FOOD   TYLENOL 325 MG OR TABS 1-2 TABLETS ORALLY EVERY 4 HOURS AS NEEDED, DO NOT EXCEED 4,000 MG OF ACETAMINOPHEN IN 24 HOURS        There is no problem list on file for this patient.      Past Surgical History:   Procedure Laterality Date     COLONOSCOPY  4/15/2011    Procedure:COLONOSCOPY; Surgeon:CHAPINCITO VELIZ; Location:[facility]     ORTHOPEDIC SURGERY       SOFT TISSUE SURGERY         Social History   Substance Use Topics     Smoking status: Not on file     Smokeless tobacco: Not on file     Alcohol use Yes      Comment: jeffrey         There is no immunization history on file for this patient.    BMI: Estimated body mass index is 27.8 kg/(m^2) as calculated from the following:    Height as of this encounter: 1.829 m (6').    Weight as of this encounter: 93 kg (205 lb).      Review of Systems   Constitutional: Positive for diaphoresis. Negative for appetite change, chills and fever.   HENT: Negative for congestion.    Respiratory: Negative for cough and shortness of breath.    Cardiovascular: Positive for chest pain (pressure sensation). Negative for palpitations and leg swelling.   Gastrointestinal: Positive for nausea. Negative for abdominal pain and vomiting.   Skin: Negative for rash.   Neurological: Negative for syncope, light-headedness and headaches.   All other systems reviewed and are negative.      Physical Exam   BP: 141/90  Pulse: 72  Resp: 16  Height: 182.9 cm (6')  Weight: 93 kg (205 lb)  SpO2: 95 %  Physical Exam   Constitutional: He is oriented to person, place, and time. He appears well-developed and well-nourished. No distress.   HENT:   Head: Normocephalic and atraumatic.   Eyes: Conjunctivae are normal.   Neck: Normal range of motion.   Cardiovascular: Normal rate, regular rhythm and normal heart sounds.    Pulmonary/Chest: Effort normal and breath sounds normal. No respiratory distress. He has no wheezes. He has no rales. He exhibits no tenderness.   Abdominal: Soft. There is no tenderness.   Musculoskeletal: Normal range of motion.   Neurological: He is alert and oriented to person, place, and time.   Skin: Skin is  warm and dry. He is not diaphoretic.   Psychiatric: He has a normal mood and affect.   Nursing note and vitals reviewed.      ED Course     ED Course     Procedures             EKG Interpretation:      Interpreted by Marcial Barros  Time reviewed: 2232  Symptoms at time of EKG: None   Rhythm: Sinus rhythm with first-degree AV block  Rate: Normal  Axis: Left Axis Deviation  Ectopy: none  Conduction: right bundle branch block (incomplete) and left anterior fasciclar block  ST Segments/ T Waves: No acute ischemic changes  Q Waves: none  Comparison to prior: No old EKG available    Clinical Impression: Sinus rhythm with first-degree AV block, incomplete right bundle branch and anterior fascicular block         EKG Interpretation:      Interpreted by Marcial Barros  Time reviewed:0105   Symptoms at time of EKG: none   Rhythm: Sinus rhythm with first-degree AV block  Rate: normal  Axis: Left axis  Ectopy: none  Conduction: Incomplete right bundle branch and anterior fascicular block  ST Segments/ T Waves: No ST-T wave changes  Q Waves: none  Comparison to prior: Not significantly changed from previous EKG tonight.    Clinical Impression: Sinus rhythm with 1st AV block with incomplete rbbb and anterior fascicular block        12:35 AM: PT re-assessed. Trop measurable, but below a positive threshold. Will draw a repeat at 3 hours since symptom onset which was 10p. (1am draw) and obtain a repeat ekg.    2:01 AM: PT re-assessed. Remains asymptomatic with no recurrent symptoms. 2nd trop elevated >0.5. Discussed results with pt and plan for heparin and transfer.     2:14 AM: Discussed with Dr Parker. Accepts for transfer. No additional orders or recommendations.    Assessments & Plan (with Medical Decision Making)  62-year-old male with no significant diagnosed past medical history presents for evaluation of acute onset of severe chest pressure with diaphoresis and nausea lasting about 5 minutes and  resolving.  Patient is active at baseline has never had exertional symptoms in the past but has not had a cardiac workup before.  Initial EKG nonischemic but did show some abnormalities including first-degree AV block and incomplete right bundle branch with anterior fascicular block.  Initial troponin measurable but below the threshold for positive at 0.029.  Repeat troponin about 2 hours later which represented a 3 hour post-symptom level was dramatically increased at 0.523.  The patient was given aspirin at the onset of evaluation and after positive troponin, heparin dosing initiated.  The patient remained asymptomatic in the emergency department with no recurrent chest pressure or pain.  Discussed case with cardiology at [facility] who agreed to transfer for further management of his non-ST elevation MI.       I have reviewed the nursing notes.    I have reviewed the findings, diagnosis, plan and need for follow up with the patient.       New Prescriptions    No medications on file       Final diagnoses:   NSTEMI (non-ST elevated myocardial infarction) (H)       6/9/2017   [facility] EMERGENCY DEPARTMENT     Barros, Marcial Zelaya MD  06/10/17 2240

## 2017-06-10 NOTE — H&P
"Formerly [facility] Cardiology H&P     Hai Redding MRN: [id number]  1955  Date of Admission:6/10/2017  Primary care provider: Clinic, [facility]  __________________________________    Assessment and Plan   Hai Redding is a 62 year old male with no significant past medical history, presenting with NSTEMI.    # NSTEMI  Cardiac chest pain with elevated troponin and non-ischemic appearing EKG. No chest pain since admission. HD stable. Troponin 0.03 --> 0.52.   -  at OSH, starting 81 mg daily   - Heparin low intensity gtt   - Metoprolol 25 mg bid   - Pravastatin 40 mg daily   - Lisinopril 10 mg daily   - trend troponin to peak   - A1c and lipid panel in AM   - Echo AM    Chronic issues - none    FEN: NPO  PPx: heparin for DVT/ranitidine for GI  Code status: FULL  Disposition:  Inpatient           Chief Complaint   Chest pain    This history was obtained from the patient and a review of the medical record.       History of Present Illness    Hai Redding is a 62 year old male with no significant past medical history, presenting with chest pain.    The patient was in his usual state of health until around 2200 last night when he was getting ready for bed. He reports sudden onset of substernal chest pain that felt like someone sitting on his chest, associated with tingling in his left arm and hand. He denies other radiation. He denies associated lightheadedness, shortness of breath, palpitations. Pain lasted for approximately 5 minutes, and resolved with rest.     The patient is very active at baseline, working as a  and , and playing softball regularly. He has not had any chest pain with exertion in the past. He is not aware of any other acute changes to his health, but feels that he has had \"less pep in his step\" for the past few months.    Tobacco: no  DM: no  FHx: no early CAD        Review of Systems   All 10 systems reviewed.  Relevant positives/negatives noted in HPI above      Past " Medical History   Patient denies PMHx       Past Surgical History     Past Surgical History:   Procedure Laterality Date     COLONOSCOPY  4/15/2011    Procedure:COLONOSCOPY; Surgeon:CHAPINCITO VELIZ; Location:[facility]     ORTHOPEDIC SURGERY       SOFT TISSUE SURGERY           Allergies      Allergies   Allergen Reactions     Shellfish Allergy          Medications     Current Facility-Administered Medications on File Prior to Encounter:  [COMPLETED] heparin Loading Dose bolus dose from infusion pump 5,000 Units   [DISCONTINUED] heparin infusion 25,000 units in 0.45% NaCl 250 mL   [COMPLETED] aspirin chewable tablet 324 mg     Current Outpatient Prescriptions on File Prior to Encounter:  IBUPROFEN 200 MG OR TABS 1 TABLET ORALLY EVERY 6 HOURS AS NEEDED FOR PAIN, TAKE WITH FOOD   TYLENOL 325 MG OR TABS 1-2 TABLETS ORALLY EVERY 4 HOURS AS NEEDED, DO NOT EXCEED 4,000 MG OF ACETAMINOPHEN IN 24 HOURS         Family History   No FHx of early CAD. Father  of/with Alzheimer's dementia. Mother is alive and healthy.      Social History     Social History   Substance Use Topics     Smoking status: Not on file     Smokeless tobacco: Not on file     Alcohol use Yes      Comment: [facility]   Patient lives at home with wife. He works full time as a , and has good psychosocial support from friends and family.      Physical Exam   Vitals: Blood pressure 120/87, temperature 97.9  F (36.6  C), temperature source Oral, resp. rate 18, height 1.829 m (6'), weight 96.2 kg (212 lb), SpO2 94 %.  Gen: alert and oriented x3, pleasant, NAD  Skin: no rash  HEENT: PERRL, EOMI, moist mucous membranes  Neck: no cervical lymphadenopathy  CV: RRR, normal S1, prominent S2, no S3, no m/r, no JVD  Lungs: CTAB, no c/w, good air movement  Abd: soft, NT, ND, NABS  Ext: WWP, no lower extremity edema  Neuro: Cranial nerve II-XII grossly intact, motor power V/V all extremities       Data     Current Labs  CBC  Recent Labs  Lab 17  2245   WBC  5.3   RBC 4.51   HGB 13.7   HCT 40.1   MCV 89   MCH 30.4   MCHC 34.2   RDW 12.5        BMP  Recent Labs  Lab 06/09/17  2245      POTASSIUM 4.1   CHLORIDE 109   CO2 23   ANIONGAP 10   *   BUN 33*   CR 0.96   GFRESTIMATED 80   GFRESTBLACK >90African American GFR Calc   OLIVIA 8.3*      INRNo lab results found in last 7 days.  Liver panel  Recent Labs  Lab 06/09/17  2245   PROTTOTAL 6.9   ALBUMIN 3.3*   BILITOTAL 0.7   ALKPHOS 82   AST 18   ALT 20     I have seen and examined the patient with the [facility] team. I agree with the assessment and plan of the note above.I have reviewed pertinent labs.     Ramiro Maldonado MD  Interventional Cardiology  Pager: [phone]

## 2017-06-10 NOTE — PLAN OF CARE
Problem: Goal Outcome Summary  Goal: Goal Outcome Summary  Pt will remain free of CP  Pt will remain hemodynamically stable   Outcome: No Change  BP 99/73  Pulse 50  Temp 97.6  F (36.4  C) (Oral)  Resp 18  Ht 1.829 m (6')  Wt 96.2 kg (212 lb)  SpO2 97%  BMI 28.75 kg/m2      VSS. SR 50's pt asymptomatic. Came back from cath lab at 12:15pm. On bedrest until 2pm. Right groin incision with scant blood, dressing intact, Right pedal pulse normal. Wife and Daughter at bedside. Plan to d/c home later today.

## 2017-06-10 NOTE — PHARMACY-ANTICOAGULATION SERVICE
Patient to start the heparin C-V/Vascular protocol with a goal anti 10a level of 0.15-0.35. Using a HT of 72 inches and a WT of 93 kg, a dosing WT of 83.8 kg was calculated. Based on this dosing WT, give patient a heparin bolus of 5000 units from the bag and then start a drip at 1000 units/hr. Check the patient's anti 10a level 6 hours after starting the drip at ~0200.   Per C-V/Vascular protocol.    CELINE Nolan.Ph.

## 2017-06-10 NOTE — DISCHARGE SUMMARY
Hospital Discharge Summary     Hai Redding MRN# [id number]   YOB: 1955 Age: 62 year old      Date of Admission: 6/10/2017  Date of Discharge:       06/10/17  Discharging MD:  Darvin Anguiano       Discharge Diagnoses:  1] NSTEMI      Procedures/Imaging Studies:  Coronary angiogram - 6/10/17:  1. Acute thrombotic 90% mLAD lesion, s/p successful placement of DAVID  2. Mild non obstructive CAD otherwise        Brief HPI:  Adapted from H&P.  Please refer to it for further details.  62 year old male with no significant past medical history, presented with chest pain.     The patient was in his usual state of health until around 2200 last night when he was getting ready for bed. He reports sudden onset of substernal chest pain that felt like someone sitting on his chest, associated with tingling in his left arm and hand. He denies other radiation. He denies associated lightheadedness, shortness of breath, palpitations. Pain lasted for approximately 5 minutes, and resolved with rest.       Hospital Course:    Labs revealed a Pk Tn of 0.523, underwent coronary angiogram which showed an acute thrombotic 90% mLAD lesion, s/p successful placement of DAVID. Post procedure he developed some oozing at the Rt groin access site, no hematoma, no tachycardia, no hypotension, no abdominal/lower back/groin pain. Hemoglobin came back stable and the oozing subsided with holding pressure for 15 mins.       Discharge Physical Exam:  /77  Pulse 52  Temp 98  F (36.7  C) (Oral)  Resp 16  Ht 1.829 m (6')  Wt 96.2 kg (212 lb)  SpO2 98%  BMI 28.75 kg/m2  GEN: aao x 3, NAD  Neck: No JVD elevation  LUNGS: No wheezing or rales  HEART: S1S2 audible, no murmurs or rubs. Regular rhythm  ABDOMEN: Soft, nt, nd. +BS  EXTREMITIES: Warm calves, +DPs, no LE edema  NEURO: aao x 3, no focal deficits        Discharge Information:  Discharge diet:  Cardiac  Discharge activity:  Activity as  tolerated  Disposition:  Discharged to home        Code Status:  FULL      Plan of care discussed with Attending Physician Dr. Maldonado on the day of discharge.    Darvin Anguiano MD  Cardiovascular Disease Fellow  Pager: [phone]        =========================================================================    Discharge Medications:   Hai Redding   Home Medication Instructions REGIS:[id number]    Printed on:06/10/17 9157   Medication Information                      aspirin 81 MG chewable tablet  Take 1 tablet (81 mg) by mouth daily             lisinopril (PRINIVIL/ZESTRIL) 10 MG tablet  Take 1 tablet (10 mg) by mouth daily             metoprolol (LOPRESSOR) 25 MG tablet  Take 1 tablet (25 mg) by mouth 2 times daily             pravastatin (PRAVACHOL) 40 MG tablet  Take 1 tablet (40 mg) by mouth every evening             ranitidine (ZANTAC) 150 MG tablet  Take 1 tablet (150 mg) by mouth 2 times daily             ticagrelor (BRILINTA) 90 MG tablet  Take 1 tablet (90 mg) by mouth 2 times daily             TYLENOL 325 MG OR TABS  1-2 TABLETS ORALLY EVERY 4 HOURS AS NEEDED, DO NOT EXCEED 4,000 MG OF ACETAMINOPHEN IN 24 HOURS                   =========================================================================      Activity   Your activity upon discharge: activity as tolerated     Reason for your hospital stay   Coronary artery disease, underwent stent placement     Adult [facility] Follow-up and recommended labs and tests   Follow up with primary care provider, [facility], within 7 days for hospital follow- up.      Please also set up an appointment with a Cardiologist within the next 2-3 weeks. Call [phone] to schedule the appointment.     Appointments on [place] and/or [facility] (with [facility] or [facility] provider or service). Call [phone] if you haven't heard regarding these appointments within 7 days of discharge.     Diet   Follow this diet upon discharge: Cardiac         I have  seen and examined the patient with the [facility] team. I agree with the assessment and plan of the discharge summary note above.I have reviewed pertinent labs. More than 30 minutes were spent organizing the patient's discharge.    Ramiro Maldonado MD  Interventional Cardiology  Pager: [phone]

## 2017-06-11 NOTE — PROGRESS NOTES
Discharge    Pt discharged to home after presenting to hospital with c/o chest pain. Pt was sent to the [facility] and had a stent placed in LAD. Pt tolerated procedure post procedure c/b right groin bleed resolved with hand held pressure. Right groin site CDI, no hematoma or bleeding currently no bruit. Pt off bedrest at 1630. Pt ambulated in scott. Pt voided. Pt HBG recheck and no changes noted. Pt denies any complaints. Pt's IV and telemetry removed. Pt and pt's wife instructed on all discharge medications, printed material given also,  Follow up appointments, activity restrictions and diet along with what to monitor for right groin site. Pt and pt's wife stated all information understood, no questions expressed. Prescriptions sent to [email or web address] in [facility]. Pt will  on way home. Pt escorted to front with all belongings safely.

## 2017-06-12 ENCOUNTER — CARE COORDINATION (OUTPATIENT)
Dept: CARE COORDINATION | Facility: CLINIC | Age: 62
End: 2017-06-12

## 2017-06-12 ENCOUNTER — CARE COORDINATION (OUTPATIENT)
Dept: CARDIOLOGY | Facility: CLINIC | Age: 62
End: 2017-06-12

## 2017-06-12 DIAGNOSIS — I25.110 CORONARY ARTERY DISEASE INVOLVING NATIVE CORONARY ARTERY WITH UNSTABLE ANGINA PECTORIS (H): Primary | ICD-10-CM

## 2017-06-12 LAB — INTERPRETATION ECG - MUSE: NORMAL

## 2017-06-12 RX ORDER — NITROGLYCERIN 0.4 MG/1
TABLET SUBLINGUAL
Qty: 25 TABLET | Refills: 1 | Status: SHIPPED | OUTPATIENT
Start: 2017-06-12 | End: 2018-02-07

## 2017-06-12 NOTE — PROGRESS NOTES
"[facility]  \"Hello, my name is Madeline Bourne , and I am calling from the [facility].  I want to check in and see how you are doing, after leaving the hospital.  You may also receive a call from your Care Coordinator (care team), but I want to make sure you don t have any urgent needs.  I have a couple questions to review with you:     Post-Discharge Outreach                                                    Hai Redding is a 62 year old male     Follow-up Appointments           Adult [facility] Follow-up and recommended labs and tests         Follow up with primary care provider, [facility], within 7 days for hospital follow- up.      Please also set up an appointment with a Cardiologist within the next 2-3 weeks. Call [phone] to schedule the appointment.                    Care Team:    Patient Care Team       Relationship Specialty Notifications [facility] PCP - General   6/9/17     Phone: [phone] Fax: [phone] [address] [facility] [address]            Transition of Care Review                                                      Patient was contacted by an RN for post DC follow up so no duplicate post DC follow up call will be made          Mary Jane Degroot, RN   Cardiology        S-(situation):  Call from triage:  Daughter, Lei calling in because her father is having shoulder pain when he turns his head to the left. He had a stent placed in his PLAD on June 10th.   He was remodeling his house and had been using the kathleen. Daughter states that he is not short of breath and the pain is relieved when he is not moving around. No pain when he turns his head to the right. He did get diaphoretic but that was with the vigorous sanding. No nausea reported. He does not have any nitro with him. He has been compliant with the ASA and Brilinta since discharge.  Groin site dry and intact, flat and " dry.     B-(background):62 year old male with no significant past medical history, presented with chest pain.  The patient was in his usual state of health until around 2200 last night when he was getting ready for bed. He reports sudden onset of substernal chest pain that felt like someone sitting on his chest, associated with tingling in his left arm and hand. He denies other radiation. He denies associated lightheadedness, shortness of breath, palpitations. Pain lasted for approximately 5 minutes, and resolved with rest.   Labs revealed a Pk Tn of 0.523, underwent coronary angiogram which showed an acute thrombotic 90% mLAD lesion, s/p successful placement of DAVID. Post procedure he developed some oozing at the Rt groin access site, no hematoma, no tachycardia, no hypotension, no abdominal/lower back/groin pain. Hemoglobin came back stable and the oozing subsided with holding pressure for 15 mins.      A-(assessment): shoulder pain when turning head to the left.     R-(recommendations): If pain continues he should go to the ER. Ordering nitro, instructions given over phone. Reviewed when and how to use the medication. Cautioned patient not to remodel house for another 3-4 days. Will follow up with phone call.                     Plan                                                      Thanks for your time.  Your Care Coordinator may follow-up within the next couple days.  In the meantime if you have questions, concerns or problems call your care team.        Madeline Bourne

## 2017-06-12 NOTE — PROGRESS NOTES
S-(situation):  Call from triage:  Daughter, Lei calling in because her father is having shoulder pain when he turns his head to the left. He had a stent placed in his PLAD on June 10th.   He was remodeling his house and had been using the kathleen. Daughter states that he is not short of breath and the pain is relieved when he is not moving around. No pain when he turns his head to the right. He did get diaphoretic but that was with the vigorous sanding. No nausea reported. He does not have any nitro with him. He has been compliant with the ASA and Brilinta since discharge.  Groin site dry and intact, flat and dry.    B-(background):62 year old male with no significant past medical history, presented with chest pain.  The patient was in his usual state of health until around 2200 last night when he was getting ready for bed. He reports sudden onset of substernal chest pain that felt like someone sitting on his chest, associated with tingling in his left arm and hand. He denies other radiation. He denies associated lightheadedness, shortness of breath, palpitations. Pain lasted for approximately 5 minutes, and resolved with rest.   Labs revealed a Pk Tn of 0.523, underwent coronary angiogram which showed an acute thrombotic 90% mLAD lesion, s/p successful placement of DAVID. Post procedure he developed some oozing at the Rt groin access site, no hematoma, no tachycardia, no hypotension, no abdominal/lower back/groin pain. Hemoglobin came back stable and the oozing subsided with holding pressure for 15 mins.      A-(assessment): shoulder pain when turning head to the left.    R-(recommendations): If pain continues he should go to the ER. Ordering nitro, instructions given over phone. Reviewed when and how to use the medication. Cautioned patient not to remodel house for another 3-4 days. Will follow up with phone call.

## 2017-06-14 ENCOUNTER — HOSPITAL ENCOUNTER (OUTPATIENT)
Dept: CARDIAC REHAB | Facility: CLINIC | Age: 62
End: 2017-06-14
Attending: INTERNAL MEDICINE
Payer: COMMERCIAL

## 2017-06-14 DIAGNOSIS — I24.9 ACS (ACUTE CORONARY SYNDROME) (H): Primary | ICD-10-CM

## 2017-06-14 PROCEDURE — 93797 PHYS/QHP OP CAR RHAB WO ECG: CPT

## 2017-06-14 PROCEDURE — 93798 PHYS/QHP OP CAR RHAB W/ECG: CPT

## 2017-06-14 PROCEDURE — 40000116 ZZH STATISTIC OP CR VISIT

## 2017-06-14 PROCEDURE — 40000575 ZZH STATISTIC OP CARDIAC VISIT #2

## 2017-06-15 ENCOUNTER — TELEPHONE (OUTPATIENT)
Dept: CARDIOLOGY | Facility: CLINIC | Age: 62
End: 2017-06-15

## 2017-06-15 NOTE — TELEPHONE ENCOUNTER
"Type of call: Symptoms Call      Symptom: bradycardia    Active now: no    Duration: intermittent    Notes: Pt's daughter called with concerns that her father's HR is 47. Upon phone assessment, pt denies any SOB, SIMPSON, dizziness, lightheadedness, sycope or syncopal episodes. Pt reports that her father is \"more tired\" than normal, but once he sits down and takes a 15 minute nap, he is \"rested\" and feels fine.    Pt's daughter reported that her father recently had an MI and a stent was placed in his LAD by Dr. Richardson in the CV cath lab at the [place]. Hai was then discharged on Brilenta, a statin, Lisinopril and Metoprolol. Writer educated pt's daughter on the possible side effects of the beta blocker. Since the patient was asymptomatic, pt's daughter was instructed to continue to monitor for now, that no immediate intervention was necessary. Pt's daughter then informed the writer that pt's baseline HR is in the 50's.     Instructed to call 911 if the pt's symptoms worsen, or if he develops chest pain, shortness of breath or if he faints.     Patient call back number: Leni - [phone]     Pt's daughter verbalized an understanding of the information provided above.    All questions and concerns were addressed.       "

## 2017-06-19 ENCOUNTER — HOSPITAL ENCOUNTER (OUTPATIENT)
Dept: CARDIAC REHAB | Facility: CLINIC | Age: 62
End: 2017-06-19
Attending: INTERNAL MEDICINE
Payer: COMMERCIAL

## 2017-06-19 PROCEDURE — 93798 PHYS/QHP OP CAR RHAB W/ECG: CPT

## 2017-06-19 PROCEDURE — 93797 PHYS/QHP OP CAR RHAB WO ECG: CPT

## 2017-06-19 PROCEDURE — 40000575 ZZH STATISTIC OP CARDIAC VISIT #2

## 2017-06-19 PROCEDURE — 40000116 ZZH STATISTIC OP CR VISIT

## 2017-06-20 ENCOUNTER — OFFICE VISIT (OUTPATIENT)
Dept: CARDIOLOGY | Facility: CLINIC | Age: 62
End: 2017-06-20
Payer: COMMERCIAL

## 2017-06-20 VITALS
HEART RATE: 53 BPM | SYSTOLIC BLOOD PRESSURE: 99 MMHG | BODY MASS INDEX: 28.07 KG/M2 | DIASTOLIC BLOOD PRESSURE: 66 MMHG | OXYGEN SATURATION: 98 % | WEIGHT: 207 LBS

## 2017-06-20 DIAGNOSIS — I25.10 CORONARY ARTERY DISEASE INVOLVING NATIVE CORONARY ARTERY OF NATIVE HEART WITHOUT ANGINA PECTORIS: Primary | ICD-10-CM

## 2017-06-20 PROCEDURE — 99213 OFFICE O/P EST LOW 20 MIN: CPT | Performed by: INTERNAL MEDICINE

## 2017-06-20 NOTE — LETTER
6/20/2017    Eliceo Cast,   [facility]   [address] [address]    RE: Hai Redding       Dear Colleague,    I had the pleasure of seeing Hai Redding in the [facility].    CARDIOLOGY VISIT    REASON FOR VISIT: f/u CAD    SUBJECTIVE:  At baseline patient is very active.  He works as a  and also is a  and plays softball.     On Nikole 10 patient presented with chest pain that came on when he was getting ready for bed.  He felt like someone was sitting on his chest and had a tingling in his left arm and hand, it lasted 5 minutes.  Troponin peaked at 0.5.     Coronary angiogram Nikole 10, 2017 showed LAD with 80% stenosis with visible thrombus after a small D1, minimal disease of the circumflex and dominant RCA, he underwent single drug-eluting stent to the mid LAD.     Since intervention he has done well.  He has had a few twinges of chest pain, but nothing like his index symptoms.  He's back to work today doing .  He is not on any lifting per restrictions.    MEDICATIONS:  Current Outpatient Prescriptions   Medication     nitroglycerin (NITROSTAT) 0.4 MG sublingual tablet     aspirin 81 MG chewable tablet     pravastatin (PRAVACHOL) 40 MG tablet     lisinopril (PRINIVIL/ZESTRIL) 10 MG tablet     metoprolol (LOPRESSOR) 25 MG tablet     ticagrelor (BRILINTA) 90 MG tablet     ranitidine (ZANTAC) 150 MG tablet     No current facility-administered medications for this visit.        ALLERGIES:  Allergies   Allergen Reactions     Shellfish Allergy        REVIEW OF SYSTEMS:  Constitutional:  No weight loss, fever, chills, weakness or fatigue.  HEENT:  Eyes:  No visual loss, blurred vision, double vision or yellow sclerae. No hearing loss, sneezing, congestion, runny nose or sore throat.  Skin:  No rash or itching.  Cardiovascular: per HPI  Respiratory: per HPI  GI:  No anorexia, nausea, vomiting or diarrhea. No  abdominal pain or blood.  :  No dysurea, hematuria  Neurologic:  No headache, dizziness, syncope, paralysis, ataxia, numbness or tingling in the extremities. No change in bowel or bladder control.  Musculoskeletal:  No muscle, back pain, joint pain or stiffness.  Hematologic:  No anemia, bleeding or bruising.  Lymphatics:  No enlarged nodes. No history of splenectomy.  Psychiatric:  No history of depression or anxiety.  Endocrine:  No reports of sweating, cold or heat intolerance. No polyuria or polydipsia.  Allergies:  No history of asthma, hives, eczema or rhinitis.    PHYSICAL EXAM:      BP: 99/66 Pulse: 53     SpO2: 98 %      Vital Signs with Ranges  Pulse:  [53] 53  BP: (99)/(66) 99/66  SpO2:  [98 %] 98 %  207 lbs 0 oz    Constitutional: awake, alert, no distress  Eyes: PERRL, sclera nonicteric  ENT: trachea midline  Respiratory: Lungs clear  Cardiovascular: Regular rate and rhythm, no murmurs  GI: nondistended, nontender, bowel sounds present  Lymph/Hematologic: no lymphadenopathy  Skin: dry, no rash  Musculoskeletal: good muscle tone, strength 5/5 in upper and lower extremities  Neurologic: no focal deficits  Neuropsychiatric: appropriate affact    DATA:  Lab: Nikole 10: Cholesterol 191, triglycerides 124, HDL 50,      ASSESSMENT:  62-year-old male seen for follow-up of newly diagnosed coronary artery disease.  Patient is doing well week after his non-STEMI with a drug-eluting stent to the mid LAD.  He had mild disease of the RCA and circumflex.  Echo will need to be done to assess his ejection fraction and wall motion.    We reviewed his medications.  If ejection fraction is normal, he may not need lisinopril, his blood pressure is somewhat low.  Importance of dual antiplatelet therapy was emphasized for one year.  He could switch to clopidogrel after 1 year if his Brilinta is expensive.    RECOMMENDATIONS:  1.  Coronary artery disease, status post recent drug-eluting stent to the LAD  - Continue dual  antiplatelet therapy through June 2018 without interruption, okay to switch to clopidogrel if his Brilinta is expensive  - Echocardiogram  - If ejection fraction is normal, discontinue lisinopril  - Recheck lipids in 4-6 weeks, goal LDL around 70, if not at goal, change pravastatin to atorvastatin    Follow-up in 6 weeks.    Elcieo Valera MD  Cardiology - [facility]  Pager:  [phone]  Text Page  June 20, 2017    Thank you for allowing me to participate in the care of your patient.    Sincerely,     Eliceo Valera MD     [facility]

## 2017-06-20 NOTE — PROGRESS NOTES
CARDIOLOGY VISIT    REASON FOR VISIT: f/u CAD    SUBJECTIVE:  At baseline patient is very active.  He works as a  and also is a  and plays softball.     On Nikole 10 patient presented with chest pain that came on when he was getting ready for bed.  He felt like someone was sitting on his chest and had a tingling in his left arm and hand, it lasted 5 minutes.  Troponin peaked at 0.5.     Coronary angiogram Nikole 10, 2017 showed LAD with 80% stenosis with visible thrombus after a small D1, minimal disease of the circumflex and dominant RCA, he underwent single drug-eluting stent to the mid LAD.     Since intervention he has done well.  He has had a few twinges of chest pain, but nothing like his index symptoms.  He's back to work today doing .  He is not on any lifting per restrictions.    MEDICATIONS:  Current Outpatient Prescriptions   Medication     nitroglycerin (NITROSTAT) 0.4 MG sublingual tablet     aspirin 81 MG chewable tablet     pravastatin (PRAVACHOL) 40 MG tablet     lisinopril (PRINIVIL/ZESTRIL) 10 MG tablet     metoprolol (LOPRESSOR) 25 MG tablet     ticagrelor (BRILINTA) 90 MG tablet     ranitidine (ZANTAC) 150 MG tablet     No current facility-administered medications for this visit.        ALLERGIES:  Allergies   Allergen Reactions     Shellfish Allergy        REVIEW OF SYSTEMS:  Constitutional:  No weight loss, fever, chills, weakness or fatigue.  HEENT:  Eyes:  No visual loss, blurred vision, double vision or yellow sclerae. No hearing loss, sneezing, congestion, runny nose or sore throat.  Skin:  No rash or itching.  Cardiovascular: per HPI  Respiratory: per HPI  GI:  No anorexia, nausea, vomiting or diarrhea. No abdominal pain or blood.  :  No dysurea, hematuria  Neurologic:  No headache, dizziness, syncope, paralysis, ataxia, numbness or tingling in the extremities. No change in bowel or bladder control.  Musculoskeletal:  No muscle, back pain, joint pain or  stiffness.  Hematologic:  No anemia, bleeding or bruising.  Lymphatics:  No enlarged nodes. No history of splenectomy.  Psychiatric:  No history of depression or anxiety.  Endocrine:  No reports of sweating, cold or heat intolerance. No polyuria or polydipsia.  Allergies:  No history of asthma, hives, eczema or rhinitis.    PHYSICAL EXAM:      BP: 99/66 Pulse: 53     SpO2: 98 %      Vital Signs with Ranges  Pulse:  [53] 53  BP: (99)/(66) 99/66  SpO2:  [98 %] 98 %  207 lbs 0 oz    Constitutional: awake, alert, no distress  Eyes: PERRL, sclera nonicteric  ENT: trachea midline  Respiratory: Lungs clear  Cardiovascular: Regular rate and rhythm, no murmurs  GI: nondistended, nontender, bowel sounds present  Lymph/Hematologic: no lymphadenopathy  Skin: dry, no rash  Musculoskeletal: good muscle tone, strength 5/5 in upper and lower extremities  Neurologic: no focal deficits  Neuropsychiatric: appropriate affact    DATA:  Lab: Nikole 10: Cholesterol 191, triglycerides 124, HDL 50,      ASSESSMENT:  62-year-old male seen for follow-up of newly diagnosed coronary artery disease.  Patient is doing well week after his non-STEMI with a drug-eluting stent to the mid LAD.  He had mild disease of the RCA and circumflex.  Echo will need to be done to assess his ejection fraction and wall motion.    We reviewed his medications.  If ejection fraction is normal, he may not need lisinopril, his blood pressure is somewhat low.  Importance of dual antiplatelet therapy was emphasized for one year.  He could switch to clopidogrel after 1 year if his Brilinta is expensive.    RECOMMENDATIONS:  1.  Coronary artery disease, status post recent drug-eluting stent to the LAD  - Continue dual antiplatelet therapy through June 2018 without interruption, okay to switch to clopidogrel if his Brilinta is expensive  - Echocardiogram  - If ejection fraction is normal, discontinue lisinopril  - Recheck lipids in 4-6 weeks, goal LDL around 70, if  not at goal, change pravastatin to atorvastatin    Follow-up in 6 weeks.    Eliceo Valera MD  Cardiology - [facility]  Pager:  [phone]  Text Page  June 20, 2017

## 2017-06-20 NOTE — MR AVS SNAPSHOT
After Visit Summary   6/20/2017    Hai Redding    MRN: [id number]           Patient Information     Date Of Birth          1955        Visit Information        Provider Department      6/20/2017 9:00 AM Eliceo Valera MD [facility] PHYSICIAN HEART AT [facility]        Today's Diagnoses     Coronary artery disease involving native coronary artery of native heart without angina pectoris    -  1       Follow-ups after your visit        Additional Services     Follow-Up with Cardiologist                 Your next 10 appointments already scheduled     Jun 21, 2017  3:00 PM CDT   [facility]iac Treatment with [facility], [facility] ([facility])    [address] [address]   [phone]            Jun 23, 2017  3:00 PM CDT   Cardiac [facility] with [facility], [facility] ([facility])    [address] [address]   [phone]            Jun 26, 2017  3:00 PM CDT   [facility]iac [facility] with [facility], [facility] ([facility])    [address] [address]   [phone]            Jun 27, 2017  2:00 PM CDT   Ech Complete with [address] Echocardiography ([facility])    [address] [facility] [address]   [phone]           1.  Please bring or wear a comfortable two-piece outfit. 2.  You may eat, drink and take your normal medicines. 3.  For any questions that cannot be answered, please contact the ordering physician            Jun 28, 2017  3:00 PM CDT   Cardiac [facility] with [facility], [facility] ([facility])    [address] [address]   [phone]            Jun 30, 2017  3:00 PM CDT   [facility], [facility]  ([facility])    [address] [facility] [address]   [phone]            Jul 03, 2017  3:00 PM CDT   Card[facility] with [facility], [facility]   [facility] ([facility])    [address] [facility] [address]   [phone]            Jul 05, 2017  3:00 PM CDT   [facility], [facility]   [facility] ([facility])    [address] [facility] [address]   [phone]            Jul 07, 2017  3:00 PM CDT   [facility], [facility]   [facility] ([facility])    [address] [facility] [address]   [phone]            Jul 10, 2017  3:00 PM CDT   [facility], [facility] ([facility])    [address] [facility] [address]   [phone]              Future tests that were ordered for you today     Open Future Orders        Priority Expected Expires Ordered    Lipid Profile Routine 8/2/2017 6/20/2018 6/20/2017    ALT Routine 6/27/2017 6/20/2018 6/20/2017    Follow-Up with Cardiologist Routine 8/2/2017 6/20/2018 6/20/2017    Echocardiogram Routine 6/27/2017 6/20/2018 6/20/2017            Who to contact     If you have questions or need follow up information about today's clinic visit or your schedule please contact [facility] PHYSICIAN HEART AT [facility] directly at [phone].  Normal or non-critical lab and imaging results will be communicated to you by MyChart, letter or phone within 4 business days after the clinic has received the results. If you do not hear from us within 7 days, please contact the clinic through MyChart or phone. If you have a critical or abnormal lab result, we will notify you by phone as soon as possible.  Submit refill requests through [employer] or call your pharmacy and they will forward the refill  "request to us. Please allow 3 business days for your refill to be completed.          Additional Information About Your Visit        [employer] Information     [employer] lets you send messages to your doctor, view your test results, renew your prescriptions, schedule appointments and more. To sign up, go to [email or web address] . Click on \"Log in\" on the left side of the screen, which will take you to the Welcome page. Then click on \"Sign up Now\" on the right side of the page.     You will be asked to enter the access code listed below, as well as some personal information. Please follow the directions to create your username and password.     Your access code is: [email or web address]  Expires: 2017  6:50 PM     Your access code will  in 90 days. If you need help or a new code, please call your [place] clinic or [phone].        Care EveryWhere ID     This is your Care EveryWhere ID. This could be used by other organizations to access your [place] medical records  [id number]        Your Vitals Were     Pulse Pulse Oximetry BMI (Body Mass Index)             53 98% 28.07 kg/m2          Blood Pressure from Last 3 Encounters:   17 99/66   06/10/17 106/77   06/10/17 117/77    Weight from Last 3 Encounters:   17 93.9 kg (207 lb)   06/10/17 96.2 kg (212 lb)   17 93 kg (205 lb)               Primary Care Provider Office Phone # Fax #    Eliceo Rizo [place]naia  [phone]       [address] [address]        Thank you!     Thank you for choosing [facility] PHYSICIAN HEART AT [facility]  for your care. Our goal is always to provide you with excellent care. Hearing back from our patients is one way we can continue to improve our services. Please take a few minutes to complete the written survey that you may receive in the mail after your visit with us. Thank you!             Your Updated Medication List - Protect others " around you: Learn how to safely use, store and throw away your medicines at www.disposemymeds.org.          This list is accurate as of: 6/20/17  9:48 AM.  Always use your most recent med list.                   Brand Name Dispense Instructions for use    aspirin 81 MG chewable tablet     36 tablet    Take 1 tablet (81 mg) by mouth daily       lisinopril 10 MG tablet    PRINIVIL/ZESTRIL    30 tablet    Take 1 tablet (10 mg) by mouth daily       metoprolol 25 MG tablet    LOPRESSOR    60 tablet    Take 1 tablet (25 mg) by mouth 2 times daily       nitroglycerin 0.4 MG sublingual tablet    NITROSTAT    25 tablet    For chest pain place 1 tablet under the tongue every 5 minutes for 3 doses. If symptoms persist 5 minutes after 1st dose call 911.       pravastatin 40 MG tablet    PRAVACHOL    30 tablet    Take 1 tablet (40 mg) by mouth every evening       ranitidine 150 MG tablet    ZANTAC    60 tablet    Take 1 tablet (150 mg) by mouth 2 times daily       ticagrelor 90 MG tablet    BRILINTA    60 tablet    Take 1 tablet (90 mg) by mouth 2 times daily

## 2017-06-23 ENCOUNTER — HOSPITAL ENCOUNTER (OUTPATIENT)
Dept: CARDIAC REHAB | Facility: CLINIC | Age: 62
End: 2017-06-23
Attending: INTERNAL MEDICINE
Payer: COMMERCIAL

## 2017-06-23 PROCEDURE — 40000116 ZZH STATISTIC OP CR VISIT: Performed by: REHABILITATION PRACTITIONER

## 2017-06-23 PROCEDURE — 93798 PHYS/QHP OP CAR RHAB W/ECG: CPT | Performed by: REHABILITATION PRACTITIONER

## 2017-06-23 NOTE — ADDENDUM NOTE
Encounter addended by: Lulu Mann EP on: 6/23/2017  2:36 PM<BR>     Actions taken: Charge Capture section accepted

## 2017-06-26 ENCOUNTER — HOSPITAL ENCOUNTER (OUTPATIENT)
Dept: CARDIAC REHAB | Facility: CLINIC | Age: 62
End: 2017-06-26
Attending: INTERNAL MEDICINE
Payer: COMMERCIAL

## 2017-06-26 PROCEDURE — 40000116 ZZH STATISTIC OP CR VISIT

## 2017-06-26 PROCEDURE — 93798 PHYS/QHP OP CAR RHAB W/ECG: CPT

## 2017-06-27 ENCOUNTER — HOSPITAL ENCOUNTER (OUTPATIENT)
Dept: CARDIOLOGY | Facility: CLINIC | Age: 62
Discharge: HOME | End: 2017-06-27
Attending: INTERNAL MEDICINE | Admitting: INTERNAL MEDICINE
Payer: COMMERCIAL

## 2017-06-27 DIAGNOSIS — I25.10 CORONARY ARTERY DISEASE INVOLVING NATIVE CORONARY ARTERY OF NATIVE HEART WITHOUT ANGINA PECTORIS: ICD-10-CM

## 2017-06-27 PROCEDURE — 93306 TTE W/DOPPLER COMPLETE: CPT | Mod: 26 | Performed by: INTERNAL MEDICINE

## 2017-06-27 PROCEDURE — 93306 TTE W/DOPPLER COMPLETE: CPT

## 2017-06-28 ENCOUNTER — HOSPITAL ENCOUNTER (OUTPATIENT)
Dept: CARDIAC REHAB | Facility: CLINIC | Age: 62
End: 2017-06-28
Attending: INTERNAL MEDICINE
Payer: COMMERCIAL

## 2017-06-28 DIAGNOSIS — I25.10 CORONARY ARTERY DISEASE INVOLVING NATIVE CORONARY ARTERY OF NATIVE HEART WITHOUT ANGINA PECTORIS: Primary | ICD-10-CM

## 2017-06-28 PROCEDURE — 40000116 ZZH STATISTIC OP CR VISIT: Performed by: REHABILITATION PRACTITIONER

## 2017-06-28 PROCEDURE — 93798 PHYS/QHP OP CAR RHAB W/ECG: CPT

## 2017-06-28 PROCEDURE — 93798 PHYS/QHP OP CAR RHAB W/ECG: CPT | Performed by: REHABILITATION PRACTITIONER

## 2017-06-28 PROCEDURE — 40000116 ZZH STATISTIC OP CR VISIT

## 2017-07-03 ENCOUNTER — HOSPITAL ENCOUNTER (OUTPATIENT)
Dept: CARDIAC REHAB | Facility: CLINIC | Age: 62
End: 2017-07-03
Attending: INTERNAL MEDICINE
Payer: COMMERCIAL

## 2017-07-03 PROCEDURE — 93798 PHYS/QHP OP CAR RHAB W/ECG: CPT

## 2017-07-03 PROCEDURE — 40000116 ZZH STATISTIC OP CR VISIT

## 2017-07-05 ENCOUNTER — HOSPITAL ENCOUNTER (OUTPATIENT)
Dept: CARDIAC REHAB | Facility: CLINIC | Age: 62
End: 2017-07-05
Attending: INTERNAL MEDICINE
Payer: COMMERCIAL

## 2017-07-05 ENCOUNTER — TELEPHONE (OUTPATIENT)
Dept: CARDIOLOGY | Facility: CLINIC | Age: 62
End: 2017-07-05

## 2017-07-05 PROCEDURE — 93798 PHYS/QHP OP CAR RHAB W/ECG: CPT

## 2017-07-05 PROCEDURE — 40000116 ZZH STATISTIC OP CR VISIT

## 2017-07-05 NOTE — TELEPHONE ENCOUNTER
Patient called stating his Brilinta is not covered.  He wants to know if this is something he has to take or maybe it could be changed to something else or maybe a PA could be done.  Please call him @ [phone].

## 2017-07-06 ENCOUNTER — TELEPHONE (OUTPATIENT)
Dept: CARDIOLOGY | Facility: CLINIC | Age: 62
End: 2017-07-06

## 2017-07-06 ENCOUNTER — MYC MEDICAL ADVICE (OUTPATIENT)
Dept: CARDIOLOGY | Facility: CLINIC | Age: 62
End: 2017-07-06

## 2017-07-06 DIAGNOSIS — I25.10 CAD (CORONARY ARTERY DISEASE): Primary | ICD-10-CM

## 2017-07-06 DIAGNOSIS — I24.9 ACS (ACUTE CORONARY SYNDROME) (H): ICD-10-CM

## 2017-07-06 RX ORDER — PRAVASTATIN SODIUM 40 MG
40 TABLET ORAL EVERY EVENING
Qty: 90 TABLET | Refills: 3 | Status: SHIPPED | OUTPATIENT
Start: 2017-07-06 | End: 2017-08-07

## 2017-07-06 RX ORDER — CLOPIDOGREL BISULFATE 75 MG/1
75 TABLET ORAL DAILY
Qty: 90 TABLET | Refills: 3 | Status: SHIPPED | OUTPATIENT
Start: 2017-07-06 | End: 2017-07-24

## 2017-07-06 RX ORDER — ASPIRIN 81 MG/1
81 TABLET, CHEWABLE ORAL DAILY
Qty: 90 TABLET | Refills: 3 | Status: SHIPPED | OUTPATIENT
Start: 2017-07-06 | End: 2023-05-01

## 2017-07-06 RX ORDER — METOPROLOL TARTRATE 25 MG/1
25 TABLET, FILM COATED ORAL 2 TIMES DAILY
Qty: 180 TABLET | Refills: 3 | Status: SHIPPED | OUTPATIENT
Start: 2017-07-06 | End: 2017-08-07

## 2017-07-06 NOTE — TELEPHONE ENCOUNTER
S-(situation): patient had called in the other day and had not received a phone call yet from staff.  He cannot afford the Brilinta and was wondering if there is a generic. Dr. Suh had written in his note that patient could switch to Plavix if the Brilinta was too expensive.    B-(background): Nikole 10 patient presented with chest pain that came on when he was getting ready for bed.  He felt like someone was sitting on his chest and had a tingling in his left arm and hand, it lasted 5 minutes.  Troponin peaked at 0.5.      Coronary angiogram Nikole 10, 2017 showed LAD with 80% stenosis with visible thrombus after a small D1, minimal disease of the circumflex and dominant RCA, he underwent single drug-eluting stent to the mid LAD.      Since intervention he has done well.  He has had a few twinges of chest pain, but nothing like his index symptoms.  He's back to work today doing .  He is not on any lifting per restrictio    A-(assessment): medication change    R-(recommendations): Date: 7/6/2017    Time of Call: 3:00 PM     Diagnosis:  Coronary artery disease     [ TORB ] Ordering provider: Dr. Suh  Order: discontinue the Brilinta  Start clopidigrel 75 mg by mouth daily , first day take 300 mg for loading dose.  Order received by: Margo Degroot RN     Follow-up/additional notes: patient understands and agrees to the plan.

## 2017-07-07 ENCOUNTER — HOSPITAL ENCOUNTER (OUTPATIENT)
Dept: CARDIAC REHAB | Facility: CLINIC | Age: 62
End: 2017-07-07
Attending: INTERNAL MEDICINE
Payer: COMMERCIAL

## 2017-07-07 PROCEDURE — 40000116 ZZH STATISTIC OP CR VISIT

## 2017-07-07 PROCEDURE — 93798 PHYS/QHP OP CAR RHAB W/ECG: CPT

## 2017-07-10 ENCOUNTER — HOSPITAL ENCOUNTER (OUTPATIENT)
Dept: CARDIAC REHAB | Facility: CLINIC | Age: 62
End: 2017-07-10
Attending: INTERNAL MEDICINE
Payer: COMMERCIAL

## 2017-07-10 ENCOUNTER — TELEPHONE (OUTPATIENT)
Dept: CARDIOLOGY | Facility: CLINIC | Age: 62
End: 2017-07-10

## 2017-07-10 DIAGNOSIS — Z98.61 HISTORY OF PERCUTANEOUS CORONARY INTERVENTION: Primary | ICD-10-CM

## 2017-07-10 PROCEDURE — 93798 PHYS/QHP OP CAR RHAB W/ECG: CPT

## 2017-07-10 PROCEDURE — 40000116 ZZH STATISTIC OP CR VISIT

## 2017-07-10 NOTE — TELEPHONE ENCOUNTER
[facility] called needing order signed in [facility] for patient's cardiac rehab.  They need this signed ASAP.  Any questions, please call [phone].

## 2017-07-14 ENCOUNTER — APPOINTMENT (OUTPATIENT)
Dept: GENERAL RADIOLOGY | Facility: CLINIC | Age: 62
End: 2017-07-14
Attending: NURSE PRACTITIONER
Payer: COMMERCIAL

## 2017-07-14 ENCOUNTER — HOSPITAL ENCOUNTER (EMERGENCY)
Facility: CLINIC | Age: 62
Discharge: HOME | End: 2017-07-14
Attending: NURSE PRACTITIONER | Admitting: NURSE PRACTITIONER
Payer: COMMERCIAL

## 2017-07-14 VITALS
DIASTOLIC BLOOD PRESSURE: 90 MMHG | HEIGHT: 72 IN | HEART RATE: 57 BPM | BODY MASS INDEX: 27.77 KG/M2 | WEIGHT: 205 LBS | OXYGEN SATURATION: 96 % | TEMPERATURE: 98.1 F | RESPIRATION RATE: 18 BRPM | SYSTOLIC BLOOD PRESSURE: 138 MMHG

## 2017-07-14 DIAGNOSIS — S60.222A CONTUSION OF LEFT HAND, INITIAL ENCOUNTER: ICD-10-CM

## 2017-07-14 PROCEDURE — 99213 OFFICE O/P EST LOW 20 MIN: CPT | Performed by: NURSE PRACTITIONER

## 2017-07-14 PROCEDURE — 99213 OFFICE O/P EST LOW 20 MIN: CPT

## 2017-07-14 PROCEDURE — 73130 X-RAY EXAM OF HAND: CPT | Mod: LT

## 2017-07-14 NOTE — ED AVS SNAPSHOT
[facility] Emergency Department    [address] [facility] [address]    Phone:  [phone]    Fax:  [phone]                                       Hai Redding   MRN: [id number]    Department:  [facility] Emergency Department   Date of Visit:  7/14/2017           After Visit Summary Signature Page     I have received my discharge instructions, and my questions have been answered. I have discussed any challenges I see with this plan with the nurse or doctor.    ..........................................................................................................................................  Patient/Patient Representative Signature      ..........................................................................................................................................  Patient Representative Print Name and Relationship to Patient    ..................................................               ................................................  Date                                            Time    ..........................................................................................................................................  Reviewed by Signature/Title    ...................................................              ..............................................  Date                                                            Time

## 2017-07-14 NOTE — ED AVS SNAPSHOT
[facility] Emergency Department    [address]    [facility] [address]    Phone:  [phone]    Fax:  [phone]                                       Hai Redding   MRN: [id number]    Department:  [facility] Emergency Department   Date of Visit:  7/14/2017           Patient Information     Date Of Birth          1955        Your diagnoses for this visit were:     Contusion of left hand, initial encounter        You were seen by Selina Hernandez APRN CNP.      Follow-up Information     Follow up with Eliceo Cast DO.    Specialty:  Family Practice    Why:  As needed    Contact information:    [facility]  [address] [address]  [phone]          Discharge Instructions         Hand Contusion  You have a contusion. This is also called a bruise. There is swelling and some bleeding under the skin, but no broken bones. This injury generally takes a few days to a few weeks to heal.  During that time, the bruise will typically change in color from reddish, to purple-blue, to greenish-yellow, then to yellow-brown.  Home care    Elevate the hand to reduce pain and swelling. As much as possible, sit or lie down with the hand raised about the level of your heart. This is especially important during the first 48 hours.    Ice the hand to help reduce pain and swelling. Wrap a cold source (ice pack or ice cubes in a plastic bag) in a thin towel. Apply to the bruised area for 20 minutes every 1 to 2 hours the first day. Continue this 3 to 4 times a day until the pain and swelling goes away.    Unless another medicine was prescribed, you can take acetaminophen, ibuprofen, or naproxen to control pain. (If you have chronic liver or kidney disease or ever had a stomach ulcer or gastrointestinal bleeding, talk with your doctor before using these medicines.)  Follow up  Follow up with your healthcare provider or our staff as advised. Call if you are not  improving within 1 to 2 weeks.  When to seek medical advice   Call your healthcare provider right away if you have any of the following:    Increased pain or swelling    Arm becomes cold, blue, numb or tingly    Signs of infection: Warmth, drainage, or increased redness or pain around the bruise    Inability to move the injured hand     Frequent bruising for unknown reasons  Date Last Reviewed: 2/1/2017 2000-2017 [employer]. [address] [place] [address]. All rights reserved. This information is not intended as a substitute for professional medical care. Always follow your healthcare professional's instructions.          Future Appointments        Provider Department Dept Phone Center    7/17/2017 3:00 PM [place] [facility], [facility] [phone] [place] [facility]    7/19/2017 3:00 PM [place] [facility], [facility] [phone] [place] [facility]    7/21/2017 3:00 PM [place] [facility], [facility] [phone] [place] [facility]    7/24/2017 3:00 PM [place] [facility], [facility] [phone] [place] [facility]    7/26/2017 3:00 PM [place] [facility], [facility] [phone] [place] [facility]    7/28/2017 3:00 PM [place] [facility], [facility] [phone] [place] [facility]    7/31/2017 3:00 PM [place] [facility], [facility] [phone] [place] [facility]    8/2/2017 3:00 PM [place] [facility], [facility] [phone] [place] [facility]    8/4/2017 3:00 PM [place] [facility], [facility] [phone] [place] [facility]    8/7/2017 3:00 PM [place] [facility], [facility] [phone] [place] [facility]    8/9/2017 3:00 PM [place] [facility], [facility] [phone] TANYA STEVENS    8/11/2017 3:00 PM [place] [facility], [facility]  [phone] [place]    8/14/2017 3:00 PM [place] [facility], [facility] [phone] [place]    8/16/2017 3:00 PM [place] [facility], [facility] [phone] [place]    8/18/2017 2:30 PM [facility] [phone] [facility]    8/18/2017 3:00 PM [place] [facility], [facility] [phone] [facility]    8/22/2017 2:30 PM Eliceo Valera MD [facility] PHYSICIAN HEART AT [facility] [phone] [facility] PSA CLIN      24 Hour Appointment Hotline       To make an appointment at any [facility], call [phone] ([phone]). If you don't have a family doctor or clinic, we will help you find one. [facility] are conveniently located to serve the needs of you and your family.             Review of your medicines      Our records show that you are taking the medicines listed below. If these are incorrect, please call your family doctor or clinic.        Dose / Directions Last dose taken    aspirin 81 MG chewable tablet   Dose:  81 mg   Quantity:  90 tablet        Take 1 tablet (81 mg) by mouth daily   Refills:  3        clopidogrel 75 MG tablet   Commonly known as:  PLAVIX   Dose:  75 mg   Quantity:  90 tablet        Take 1 tablet (75 mg) by mouth daily   Refills:  3        metoprolol 25 MG tablet   Commonly known as:  LOPRESSOR   Dose:  25 mg   Quantity:  180 tablet        Take 1 tablet (25 mg) by mouth 2 times daily   Refills:  3        nitroGLYcerin 0.4 MG sublingual tablet   Commonly known as:  NITROSTAT   Quantity:  25 tablet        For chest pain place 1 tablet under the tongue every 5 minutes for 3 doses. If symptoms persist 5 minutes after 1st dose call [phone].   Refills:  1        pravastatin 40 MG tablet   Commonly known as:  PRAVACHOL   Dose:  40 mg   Quantity:  90 tablet        Take 1 tablet (40 mg) by mouth every evening   Refills:  3        ranitidine 150 MG tablet    Commonly known as:  ZANTAC   Dose:  150 mg   Quantity:  60 tablet        Take 1 tablet (150 mg) by mouth 2 times daily   Refills:  3                Procedures and tests performed during your visit     Hand XR, G/E 3 views, left      Orders Needing Specimen Collection     None      Pending Results     Date and Time Order Name Status Description    7/14/2017 2018 Hand XR, G/E 3 views, left Preliminary             Pending Culture Results     No orders found from 7/12/2017 to 7/15/2017.            Pending Results Instructions     If you had any lab results that were not finalized at the time of your Discharge, you can call the ED Lab Result RN at [phone]. You will be contacted by this team for any positive Lab results or changes in treatment. The nurses are available 7 days a week from 10A to 6:30P.  You can leave a message 24 hours per day and they will return your call.        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  Pain after injury to the remaining stump.    COMPARISON:  None.    FINDINGS: There is amputation of the index finger at the level of the  base of the proximal phalanx. No fracture or osseous lesion is seen.  The joint spaces are well preserved. No adjacent soft tissue pathology  is seen.        Impression     IMPRESSION: No acute abnormality is seen.                Thank you for choosing [facility]       Thank you for choosing [facility] for your care. Our goal is always to provide you with excellent care. Hearing back from our patients is one way we can continue to improve our services. Please take a few minutes to complete the written survey that you may receive in the mail after you visit with us. Thank you!        [employer] Information     [employer] gives you secure access to your electronic health record. If you see a primary care provider, you can  also send messages to your care team and make appointments. If you have questions, please call your primary care clinic.  If you do not have a primary care provider, please call [phone] and they will assist you.        Care EveryWhere ID     This is your Care EveryWhere ID. This could be used by other organizations to access your [place] medical records  [id number]        Equal Access to Services     CLOVER CH : Hadii timothy Duran, refugio francisco, gladys kaalmakira martinez, anthony leonard . So [facility] [phone].    ATENCIÓN: Si habla español, tiene a marie disposición servicios gratuitos de asistencia lingüística. Elly al [phone].    We comply with applicable federal civil rights laws and [place] laws. We do not discriminate on the basis of race, color, national origin, age, disability sex, sexual orientation or gender identity.            After Visit Summary       This is your record. Keep this with you and show to your community pharmacist(s) and doctor(s) at your next visit.

## 2017-07-15 NOTE — DISCHARGE INSTRUCTIONS
Hand Contusion  You have a contusion. This is also called a bruise. There is swelling and some bleeding under the skin, but no broken bones. This injury generally takes a few days to a few weeks to heal.  During that time, the bruise will typically change in color from reddish, to purple-blue, to greenish-yellow, then to yellow-brown.  Home care    Elevate the hand to reduce pain and swelling. As much as possible, sit or lie down with the hand raised about the level of your heart. This is especially important during the first 48 hours.    Ice the hand to help reduce pain and swelling. Wrap a cold source (ice pack or ice cubes in a plastic bag) in a thin towel. Apply to the bruised area for 20 minutes every 1 to 2 hours the first day. Continue this 3 to 4 times a day until the pain and swelling goes away.    Unless another medicine was prescribed, you can take acetaminophen, ibuprofen, or naproxen to control pain. (If you have chronic liver or kidney disease or ever had a stomach ulcer or gastrointestinal bleeding, talk with your doctor before using these medicines.)  Follow up  Follow up with your healthcare provider or our staff as advised. Call if you are not improving within 1 to 2 weeks.  When to seek medical advice   Call your healthcare provider right away if you have any of the following:    Increased pain or swelling    Arm becomes cold, blue, numb or tingly    Signs of infection: Warmth, drainage, or increased redness or pain around the bruise    Inability to move the injured hand     Frequent bruising for unknown reasons  Date Last Reviewed: 2/1/2017 2000-2017 [employer]. [address] [place] [address], [place] [address]. All rights reserved. This information is not intended as a substitute for professional medical care. Always follow your healthcare professional's instructions.

## 2017-07-15 NOTE — ED PROVIDER NOTES
"  History     Chief Complaint   Patient presents with     Hand Injury     HPI  Hai Redding is a 62 year old male with history of ACS (NSTEMI) who presents to urgent care for evaluation of left hand injury.  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  Patient is left-handed.    I have reviewed the Medications, Allergies, Past Medical and Surgical History, and Social History in the Epic system.    Allergies:   Allergies   Allergen Reactions     Shellfish Allergy        No current facility-administered medications on file prior to encounter.   Current Outpatient Prescriptions on File Prior to Encounter:  clopidogrel (PLAVIX) 75 MG tablet Take 1 tablet (75 mg) by mouth daily   metoprolol (LOPRESSOR) 25 MG tablet Take 1 tablet (25 mg) by mouth 2 times daily   pravastatin (PRAVACHOL) 40 MG tablet Take 1 tablet (40 mg) by mouth every evening   ranitidine (ZANTAC) 150 MG tablet Take 1 tablet (150 mg) by mouth 2 times daily   aspirin 81 MG chewable tablet Take 1 tablet (81 mg) by mouth daily   nitroglycerin (NITROSTAT) 0.4 MG sublingual tablet For chest pain place 1 tablet under the tongue every 5 minutes for 3 doses. If symptoms persist 5 minutes after 1st dose call 911.     Patient Active Problem List   Diagnosis     ACS (acute coronary syndrome) (H)       Review of Systems  As mentioned above in the history present illness. All other systems were reviewed and are negative.    Physical Exam   BP: 138/90  Pulse: 57  Temp: 98.1  F (36.7  C)  Resp: 18  Height: 182.9 cm (6')  Weight: 93 kg (205 lb)  SpO2: 96 %  Physical Exam  .  Appearance: in no apparent distress and well developed and well nourished.  Left Hand exam: Left index finger \"stump\" swollen and tender.      ED Course     ED Course     Procedures         Results for orders placed or performed during the " hospital encounter of 07/14/17 (from the past 24 hour(s))   Hand XR, G/E 3 views, left    Narrative    LEFT HAND THREE VIEWS  7/14/2017 8:26 PM    HISTORY: Traumatic amputation of the left index finger 18 years ago.  Pain after injury to the remaining stump.    COMPARISON:  None.    FINDINGS: There is amputation of the index finger at the level of the  base of the proximal phalanx. No fracture or osseous lesion is seen.  The joint spaces are well preserved. No adjacent soft tissue pathology  is seen.      Impression    IMPRESSION: No acute abnormality is seen.       Labs Ordered and Resulted from Time of ED Arrival Up to the Time of Departure from the ED - No data to display    Assessments & Plan (with Medical Decision Making)     I have reviewed the nursing notes.    I have reviewed the findings, diagnosis, plan and need for follow up with the patient.      Discharge Medication List as of 7/14/2017  8:52 PM          Final diagnoses:   Contusion of left hand, initial encounter   --ice   --tylenol    7/14/2017   [facility] EMERGENCY DEPARTMENT     Selina Hernandez APRN CNP  07/14/17 [id number]

## 2017-07-16 ENCOUNTER — NURSE TRIAGE (OUTPATIENT)
Dept: NURSING | Facility: CLINIC | Age: 62
End: 2017-07-16

## 2017-07-16 NOTE — TELEPHONE ENCOUNTER
Hai is currently taking Plavix and wants Adonis  to know that Hai started out with a loading dose of Plavix 4 pills for the first day and  he accidentally took 2 pills a day after that for one week.  Hai today states that he is taking 1 tablet per day now.  Hai is not having any symptoms  but would like to speak with MD Maldonado regarding this issue.  Hai is requesting MD rubin him or call him at [phone].  Hai is also   requesting the name of MD Maldonado be added to his mychart so he can message MD himself.

## 2017-07-21 ENCOUNTER — HOSPITAL ENCOUNTER (OUTPATIENT)
Dept: CARDIAC REHAB | Facility: CLINIC | Age: 62
End: 2017-07-21
Attending: INTERNAL MEDICINE
Payer: COMMERCIAL

## 2017-07-21 PROCEDURE — 40000116 ZZH STATISTIC OP CR VISIT

## 2017-07-21 PROCEDURE — 93798 PHYS/QHP OP CAR RHAB W/ECG: CPT

## 2017-07-23 ENCOUNTER — MYC MEDICAL ADVICE (OUTPATIENT)
Dept: CARDIOLOGY | Facility: CLINIC | Age: 62
End: 2017-07-23

## 2017-07-23 DIAGNOSIS — I25.10 CAD (CORONARY ARTERY DISEASE): ICD-10-CM

## 2017-07-24 RX ORDER — CLOPIDOGREL BISULFATE 75 MG/1
75 TABLET ORAL DAILY
Qty: 90 TABLET | Refills: 3 | Status: SHIPPED | OUTPATIENT
Start: 2017-07-24 | End: 2017-08-28

## 2017-08-06 ENCOUNTER — MYC REFILL (OUTPATIENT)
Dept: CARDIOLOGY | Facility: CLINIC | Age: 62
End: 2017-08-06

## 2017-08-06 ENCOUNTER — MYC MEDICAL ADVICE (OUTPATIENT)
Dept: CARDIOLOGY | Facility: CLINIC | Age: 62
End: 2017-08-06

## 2017-08-06 DIAGNOSIS — I24.9 ACS (ACUTE CORONARY SYNDROME) (H): ICD-10-CM

## 2017-08-06 RX ORDER — METOPROLOL TARTRATE 25 MG/1
25 TABLET, FILM COATED ORAL 2 TIMES DAILY
Qty: 180 TABLET | Refills: 3 | Status: CANCELLED | OUTPATIENT
Start: 2017-08-06

## 2017-08-06 RX ORDER — PRAVASTATIN SODIUM 40 MG
40 TABLET ORAL EVERY EVENING
Qty: 90 TABLET | Refills: 3 | Status: CANCELLED | OUTPATIENT
Start: 2017-08-06

## 2017-08-07 RX ORDER — PRAVASTATIN SODIUM 40 MG
40 TABLET ORAL EVERY EVENING
Qty: 90 TABLET | Refills: 3 | Status: SHIPPED | OUTPATIENT
Start: 2017-08-07 | End: 2017-08-07

## 2017-08-07 RX ORDER — PRAVASTATIN SODIUM 40 MG
40 TABLET ORAL EVERY EVENING
Qty: 90 TABLET | Refills: 3 | Status: SHIPPED | OUTPATIENT
Start: 2017-08-07 | End: 2017-08-28

## 2017-08-07 RX ORDER — METOPROLOL TARTRATE 25 MG/1
25 TABLET, FILM COATED ORAL 2 TIMES DAILY
Qty: 180 TABLET | Refills: 3 | Status: SHIPPED | OUTPATIENT
Start: 2017-08-07 | End: 2017-08-28

## 2017-08-07 RX ORDER — METOPROLOL TARTRATE 25 MG/1
25 TABLET, FILM COATED ORAL 2 TIMES DAILY
Qty: 180 TABLET | Refills: 3 | Status: SHIPPED | OUTPATIENT
Start: 2017-08-07 | End: 2017-08-07

## 2017-08-07 NOTE — TELEPHONE ENCOUNTER
Message from MyChart:  Original authorizing provider: Ramiro Maldonado MD    Hai Shortly would like a refill of the following medications:  metoprolol (LOPRESSOR) 25 MG tablet [Ramiro Maldonado MD]  pravastatin (PRAVACHOL) 40 MG tablet [Ramiro Maldonado MD]    Preferred pharmacy: [facility] [address] [place] [address] - [address]    Comment:

## 2017-08-09 ENCOUNTER — HOSPITAL ENCOUNTER (OUTPATIENT)
Dept: CARDIAC REHAB | Facility: CLINIC | Age: 62
End: 2017-08-09
Attending: INTERNAL MEDICINE
Payer: COMMERCIAL

## 2017-08-09 PROCEDURE — 93798 PHYS/QHP OP CAR RHAB W/ECG: CPT

## 2017-08-09 PROCEDURE — 40000116 ZZH STATISTIC OP CR VISIT

## 2017-08-16 ENCOUNTER — HOSPITAL ENCOUNTER (OUTPATIENT)
Dept: CARDIAC REHAB | Facility: CLINIC | Age: 62
End: 2017-08-16
Attending: INTERNAL MEDICINE
Payer: COMMERCIAL

## 2017-08-16 PROCEDURE — 40000116 ZZH STATISTIC OP CR VISIT

## 2017-08-16 PROCEDURE — 93798 PHYS/QHP OP CAR RHAB W/ECG: CPT

## 2017-08-22 ENCOUNTER — OFFICE VISIT (OUTPATIENT)
Dept: CARDIOLOGY | Facility: CLINIC | Age: 62
End: 2017-08-22
Attending: INTERNAL MEDICINE
Payer: COMMERCIAL

## 2017-08-22 VITALS
SYSTOLIC BLOOD PRESSURE: 119 MMHG | BODY MASS INDEX: 28.45 KG/M2 | HEART RATE: 54 BPM | OXYGEN SATURATION: 95 % | DIASTOLIC BLOOD PRESSURE: 76 MMHG | WEIGHT: 209.8 LBS

## 2017-08-22 DIAGNOSIS — I25.10 CORONARY ARTERY DISEASE INVOLVING NATIVE CORONARY ARTERY OF NATIVE HEART WITHOUT ANGINA PECTORIS: ICD-10-CM

## 2017-08-22 PROCEDURE — 99214 OFFICE O/P EST MOD 30 MIN: CPT | Performed by: INTERNAL MEDICINE

## 2017-08-22 NOTE — PROGRESS NOTES
CARDIOLOGY VISIT    REASON FOR VISIT: Follow up CAD    SUBJECTIVE:  63 y/o male seen for f/u of CAD.     On Nikole 10 patient presented with chest pain that came on when he was getting ready for bed.  He felt like someone was sitting on his chest and had a tingling in his left arm and hand, it lasted 5 minutes.  Troponin peaked at 0.5.        Coronary angiogram Nikole 10, 2017 showed LAD with 80% stenosis with visible thrombus after a small D1, minimal disease of the circumflex and dominant RCA, he underwent single drug-eluting stent to the mid LAD.        Echo June 2017 showed EF 60%, normal wall motion, normal right ventricle, no valve disease, sinus of Valsalva 4.5 cm, ascending aorta 3.7 cm.     He's been doing well in the past 2 months.  He is back playing softball and doing all his previous activities.  He has some mild fatigue, but no chest pain or shortness of breath.    MEDICATIONS:  Current Outpatient Prescriptions   Medication     pravastatin (PRAVACHOL) 40 MG tablet     metoprolol (LOPRESSOR) 25 MG tablet     clopidogrel (PLAVIX) 75 MG tablet     ranitidine (ZANTAC) 150 MG tablet     aspirin 81 MG chewable tablet     nitroglycerin (NITROSTAT) 0.4 MG sublingual tablet     No current facility-administered medications for this visit.        ALLERGIES:  Allergies   Allergen Reactions     Shellfish Allergy        REVIEW OF SYSTEMS:  Constitutional:  No weight loss, fever, chills, weakness or fatigue.  HEENT:  Eyes:  No visual loss, blurred vision, double vision or yellow sclerae. No hearing loss, sneezing, congestion, runny nose or sore throat.  Skin:  No rash or itching.  Cardiovascular: per HPI  Respiratory: per HPI  GI:  No anorexia, nausea, vomiting or diarrhea. No abdominal pain or blood.  :  No dysurea, hematuria  Neurologic:  No headache, dizziness, syncope, paralysis, ataxia, numbness or tingling in the extremities. No change in bowel or bladder control.  Musculoskeletal:  No muscle, back pain, joint  pain or stiffness.  Hematologic:  No anemia, bleeding or bruising.  Lymphatics:  No enlarged nodes. No history of splenectomy.  Psychiatric:  No history of depression or anxiety.  Endocrine:  No reports of sweating, cold or heat intolerance. No polyuria or polydipsia.  Allergies:  No history of asthma, hives, eczema or rhinitis.    PHYSICAL EXAM:      BP: 119/76 Pulse: 54     SpO2: 95 %      Vital Signs with Ranges  Pulse:  [54] 54  BP: (119)/(76) 119/76  SpO2:  [95 %] 95 %  209 lbs 12.8 oz    Constitutional: awake, alert, no distress  Eyes: PERRL, sclera nonicteric  ENT: trachea midline  Respiratory: Lungs clear  Cardiovascular: Regular rate and rhythm, no murmurs  GI: nondistended, nontender, bowel sounds present  Lymph/Hematologic: no lymphadenopathy  Skin: dry, no rash  Musculoskeletal: good muscle tone, strength 5/5 in upper and lower extremities  Neurologic: no focal deficits  Neuropsychiatric: appropriate affact    ASSESSMENT:  62-year-old male seen for follow-up of coronary artery disease.  Clinically he is doing very well.  He has no concerning symptoms.  He is on good medical therapy.  Echo showed mild ascending aorta enlargement, echo will need to be repeated in the future.    RECOMMENDATIONS:  1.  Coronary artery disease  - Continue dual antiplatelet therapy through June 2018  - Continue metoprolol for now, although he may be having some fatigue from this    2.  Borderline dyslipidemia  - Recheck lipids, goal LDL less than 70, if not goal, change atorvastatin    3.  Mild ascending aortic dilation  - Repeat echo in about 2 years    Follow-up in 9 months.    Eliceo Valera MD  Cardiology - [facility]  Pager:  [phone]  Text Page  August 22, 2017

## 2017-08-22 NOTE — MR AVS SNAPSHOT
After Visit Summary   8/22/2017    Hai Redding    MRN: [id number]           Patient Information     Date Of Birth          1955        Visit Information        Provider Department      8/22/2017 2:30 PM Eliceo Valera MD [facility] PHYSICIAN HEART AT [facility]        Today's Diagnoses     Coronary artery disease involving native coronary artery of native heart without angina pectoris           Follow-ups after your visit        Additional Services     Follow-Up with Cardiologist                 Future tests that were ordered for you today     Open Future Orders        Priority Expected Expires Ordered    Follow-Up with Cardiologist Routine 5/19/2018 8/22/2018 8/22/2017    Lipid Profile Routine 8/29/2017 8/22/2018 8/22/2017            Who to contact     If you have questions or need follow up information about today's clinic visit or your schedule please contact [facility] PHYSICIAN HEART AT [facility] directly at [phone].  Normal or non-critical lab and imaging results will be communicated to you by [employer], letter or phone within 4 business days after the clinic has received the results. If you do not hear from us within 7 days, please contact the clinic through [employer] or phone. If you have a critical or abnormal lab result, we will notify you by phone as soon as possible.  Submit refill requests through [employer] or call your pharmacy and they will forward the refill request to us. Please allow 3 business days for your refill to be completed.          Additional Information About Your Visit        MyChart Information     [employer] gives you secure access to your electronic health record. If you see a primary care provider, you can also send messages to your care team and make appointments. If you have questions, please call your primary care clinic.  If you do not have a primary care provider, please call [phone] and they will assist you.         Care EveryWhere ID     This is your Care EveryWhere ID. This could be used by other organizations to access your [place] medical records  [id number]        Your Vitals Were     Pulse Pulse Oximetry BMI (Body Mass Index)             54 95% 28.45 kg/m2          Blood Pressure from Last 3 Encounters:   08/22/17 119/76   07/14/17 138/90   06/20/17 99/66    Weight from Last 3 Encounters:   08/22/17 95.2 kg (209 lb 12.8 oz)   07/14/17 93 kg (205 lb)   06/20/17 93.9 kg (207 lb)              We Performed the Following     Follow-Up with Cardiologist        Primary Care Provider Office Phone # Fax #    Eliceo Cast [phone]       [facility] [address] [facility] [address]        Equal Access to Services     [employer] : Hadii timothy vasquez hadasho Soomaali, waaxda luqadaha, qaybta kaalmada adecolinyada, anthony leonard . So [facility] [phone].    ATENCIÓN: Si habla español, tiene a marie disposición servicios gratuitos de asistencia lingüística. Elly al [phone].    We comply with applicable federal civil rights laws and [place] laws. We do not discriminate on the basis of race, color, national origin, age, disability sex, sexual orientation or gender identity.            Thank you!     Thank you for choosing [facility] PHYSICIAN HEART AT [facility]  for your care. Our goal is always to provide you with excellent care. Hearing back from our patients is one way we can continue to improve our services. Please take a few minutes to complete the written survey that you may receive in the mail after your visit with us. Thank you!             Your Updated Medication List - Protect others around you: Learn how to safely use, store and throw away your medicines at www.disposemymeds.org.          This list is accurate as of: 8/22/17  3:36 PM.  Always use your most recent med list.                   Brand Name Dispense Instructions  for use Diagnosis    aspirin 81 MG chewable tablet     90 tablet    Take 1 tablet (81 mg) by mouth daily    ACS (acute coronary syndrome) (H)       clopidogrel 75 MG tablet    PLAVIX    90 tablet    Take 1 tablet (75 mg) by mouth daily    CAD (coronary artery disease)       metoprolol 25 MG tablet    LOPRESSOR    180 tablet    Take 1 tablet (25 mg) by mouth 2 times daily    ACS (acute coronary syndrome) (H)       nitroGLYcerin 0.4 MG sublingual tablet    NITROSTAT    25 tablet    For chest pain place 1 tablet under the tongue every 5 minutes for 3 doses. If symptoms persist 5 minutes after 1st dose call 911.    Coronary artery disease involving native coronary artery with unstable angina pectoris (H)       pravastatin 40 MG tablet    PRAVACHOL    90 tablet    Take 1 tablet (40 mg) by mouth every evening    ACS (acute coronary syndrome) (H)       ranitidine 150 MG tablet    ZANTAC    60 tablet    Take 1 tablet (150 mg) by mouth 2 times daily    ACS (acute coronary syndrome) (H)

## 2017-08-22 NOTE — LETTER
8/22/2017    Eliceo Cast [facility]   [address] [address]    RE: Hai Redding       Dear Colleague,    I had the pleasure of seeing Hai Redding in the [facility].    CARDIOLOGY VISIT    REASON FOR VISIT: Follow up CAD    SUBJECTIVE:  61 y/o male seen for f/u of CAD.     On Nikole 10 patient presented with chest pain that came on when he was getting ready for bed.  He felt like someone was sitting on his chest and had a tingling in his left arm and hand, it lasted 5 minutes.  Troponin peaked at 0.5.        Coronary angiogram Nikole 10, 2017 showed LAD with 80% stenosis with visible thrombus after a small D1, minimal disease of the circumflex and dominant RCA, he underwent single drug-eluting stent to the mid LAD.        Echo June 2017 showed EF 60%, normal wall motion, normal right ventricle, no valve disease, sinus of Valsalva 4.5 cm, ascending aorta 3.7 cm.     He's been doing well in the past 2 months.  He is back playing softball and doing all his previous activities.  He has some mild fatigue, but no chest pain or shortness of breath.    MEDICATIONS:  Current Outpatient Prescriptions   Medication     pravastatin (PRAVACHOL) 40 MG tablet     metoprolol (LOPRESSOR) 25 MG tablet     clopidogrel (PLAVIX) 75 MG tablet     ranitidine (ZANTAC) 150 MG tablet     aspirin 81 MG chewable tablet     nitroglycerin (NITROSTAT) 0.4 MG sublingual tablet     No current facility-administered medications for this visit.        ALLERGIES:  Allergies   Allergen Reactions     Shellfish Allergy        REVIEW OF SYSTEMS:  Constitutional:  No weight loss, fever, chills, weakness or fatigue.  HEENT:  Eyes:  No visual loss, blurred vision, double vision or yellow sclerae. No hearing loss, sneezing, congestion, runny nose or sore throat.  Skin:  No rash or itching.  Cardiovascular: per HPI  Respiratory: per HPI  GI:  No anorexia, nausea, vomiting or diarrhea.  No abdominal pain or blood.  :  No dysurea, hematuria  Neurologic:  No headache, dizziness, syncope, paralysis, ataxia, numbness or tingling in the extremities. No change in bowel or bladder control.  Musculoskeletal:  No muscle, back pain, joint pain or stiffness.  Hematologic:  No anemia, bleeding or bruising.  Lymphatics:  No enlarged nodes. No history of splenectomy.  Psychiatric:  No history of depression or anxiety.  Endocrine:  No reports of sweating, cold or heat intolerance. No polyuria or polydipsia.  Allergies:  No history of asthma, hives, eczema or rhinitis.    PHYSICAL EXAM:      BP: 119/76 Pulse: 54     SpO2: 95 %      Vital Signs with Ranges  Pulse:  [54] 54  BP: (119)/(76) 119/76  SpO2:  [95 %] 95 %  209 lbs 12.8 oz    Constitutional: awake, alert, no distress  Eyes: PERRL, sclera nonicteric  ENT: trachea midline  Respiratory: Lungs clear  Cardiovascular: Regular rate and rhythm, no murmurs  GI: nondistended, nontender, bowel sounds present  Lymph/Hematologic: no lymphadenopathy  Skin: dry, no rash  Musculoskeletal: good muscle tone, strength 5/5 in upper and lower extremities  Neurologic: no focal deficits  Neuropsychiatric: appropriate affact    ASSESSMENT:  62-year-old male seen for follow-up of coronary artery disease.  Clinically he is doing very well.  He has no concerning symptoms.  He is on good medical therapy.  Echo showed mild ascending aorta enlargement, echo will need to be repeated in the future.    RECOMMENDATIONS:  1.  Coronary artery disease  - Continue dual antiplatelet therapy through June 2018  - Continue metoprolol for now, although he may be having some fatigue from this    2.  Borderline dyslipidemia  - Recheck lipids, goal LDL less than 70, if not goal, change atorvastatin    3.  Mild ascending aortic dilation  - Repeat echo in about 2 years    Follow-up in 9 months.    Eliceo Valera MD  Cardiology - [facility] Heart  Pager:  [phone]  Text Page  August 22, 2017    Thank  you for allowing me to participate in the care of your patient.    Sincerely,     Eliceo Valera MD     [facility]

## 2017-08-27 ENCOUNTER — MYC MEDICAL ADVICE (OUTPATIENT)
Dept: CARDIOLOGY | Facility: CLINIC | Age: 62
End: 2017-08-27

## 2017-08-27 DIAGNOSIS — I25.10 CAD (CORONARY ARTERY DISEASE): ICD-10-CM

## 2017-08-27 DIAGNOSIS — I24.9 ACS (ACUTE CORONARY SYNDROME) (H): ICD-10-CM

## 2017-08-28 RX ORDER — CLOPIDOGREL BISULFATE 75 MG/1
75 TABLET ORAL DAILY
Qty: 90 TABLET | Refills: 3 | Status: SHIPPED | OUTPATIENT
Start: 2017-08-28 | End: 2018-08-29

## 2017-08-28 RX ORDER — METOPROLOL TARTRATE 25 MG/1
25 TABLET, FILM COATED ORAL 2 TIMES DAILY
Qty: 180 TABLET | Refills: 3 | Status: SHIPPED | OUTPATIENT
Start: 2017-08-28 | End: 2018-09-07

## 2017-08-28 RX ORDER — PRAVASTATIN SODIUM 40 MG
40 TABLET ORAL EVERY EVENING
Qty: 30 TABLET | Refills: 0 | Status: SHIPPED | OUTPATIENT
Start: 2017-08-28 | End: 2017-09-01 | Stop reason: ALTCHOICE

## 2017-08-31 DIAGNOSIS — I25.10 CORONARY ARTERY DISEASE INVOLVING NATIVE CORONARY ARTERY OF NATIVE HEART WITHOUT ANGINA PECTORIS: ICD-10-CM

## 2017-08-31 LAB
ALT SERPL W P-5'-P-CCNC: 25 U/L (ref 0–70)
CHOLEST SERPL-MCNC: 185 MG/DL
HDLC SERPL-MCNC: 59 MG/DL
LDLC SERPL CALC-MCNC: 108 MG/DL
NONHDLC SERPL-MCNC: 126 MG/DL
TRIGL SERPL-MCNC: 91 MG/DL

## 2017-08-31 PROCEDURE — 36415 COLL VENOUS BLD VENIPUNCTURE: CPT | Performed by: INTERNAL MEDICINE

## 2017-08-31 PROCEDURE — 84460 ALANINE AMINO (ALT) (SGPT): CPT | Performed by: INTERNAL MEDICINE

## 2017-08-31 PROCEDURE — 80061 LIPID PANEL: CPT | Performed by: INTERNAL MEDICINE

## 2017-09-01 DIAGNOSIS — E78.5 HYPERLIPIDEMIA LDL GOAL <70: ICD-10-CM

## 2017-09-01 DIAGNOSIS — I24.9 ACS (ACUTE CORONARY SYNDROME) (H): Primary | ICD-10-CM

## 2017-09-01 RX ORDER — ATORVASTATIN CALCIUM 40 MG/1
40 TABLET, FILM COATED ORAL DAILY
Qty: 30 TABLET | Refills: 3 | Status: SHIPPED | OUTPATIENT
Start: 2017-09-01 | End: 2018-01-05

## 2017-09-25 ENCOUNTER — TELEPHONE (OUTPATIENT)
Dept: CARDIAC REHAB | Facility: CLINIC | Age: 62
End: 2017-09-25

## 2017-09-25 NOTE — TELEPHONE ENCOUNTER
Pt has not attended CR since 8/21, spoke with Pt regarding his inconsistent attendance.  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

## 2017-10-18 ENCOUNTER — HOSPITAL ENCOUNTER (OUTPATIENT)
Dept: CARDIAC REHAB | Facility: CLINIC | Age: 62
End: 2017-10-18
Attending: INTERNAL MEDICINE
Payer: COMMERCIAL

## 2017-10-18 PROCEDURE — 93798 PHYS/QHP OP CAR RHAB W/ECG: CPT

## 2017-10-18 PROCEDURE — 40000116 ZZH STATISTIC OP CR VISIT

## 2017-11-29 NOTE — ADDENDUM NOTE
Encounter addended by: Lulu Mann EP on: 11/29/2017  9:56 AM<BR>     Actions taken: Sign clinical note, Episode resolved

## 2017-11-29 NOTE — PROGRESS NOTES
Cardiac Rehab Discharge Summary    Reason for discharge:    Unable to connect with patient. Patient last attended rehab on 10/18/17.    Progress towards goals:  Goals met    Recommendation(s):    Pt reached peak METs of 6.8, overall doing well. Pt attended rehab inconsistent due to work schedule, activities he was involved in outside of work. Pt last attended 10/18 and unable to reach patient. Patient will be discharged from rehab at this time.     Physician cosignature/electronic signature indicates agreements with the ITP document and approval of discharge.

## 2018-01-05 DIAGNOSIS — I24.9 ACS (ACUTE CORONARY SYNDROME) (H): ICD-10-CM

## 2018-01-05 DIAGNOSIS — E78.5 HYPERLIPIDEMIA LDL GOAL <70: ICD-10-CM

## 2018-01-05 RX ORDER — ATORVASTATIN CALCIUM 40 MG/1
40 TABLET, FILM COATED ORAL DAILY
Qty: 30 TABLET | Refills: 1 | Status: SHIPPED | OUTPATIENT
Start: 2018-01-05 | End: 2018-01-08

## 2018-01-06 DIAGNOSIS — E78.5 HYPERLIPIDEMIA LDL GOAL <70: ICD-10-CM

## 2018-01-06 DIAGNOSIS — I24.9 ACS (ACUTE CORONARY SYNDROME) (H): ICD-10-CM

## 2018-01-06 LAB
ALT SERPL W P-5'-P-CCNC: 31 U/L (ref 0–70)
CHOLEST SERPL-MCNC: 152 MG/DL
HDLC SERPL-MCNC: 63 MG/DL
LDLC SERPL CALC-MCNC: 75 MG/DL
NONHDLC SERPL-MCNC: 89 MG/DL
TRIGL SERPL-MCNC: 71 MG/DL

## 2018-01-06 PROCEDURE — 80061 LIPID PANEL: CPT | Performed by: INTERNAL MEDICINE

## 2018-01-06 PROCEDURE — 36415 COLL VENOUS BLD VENIPUNCTURE: CPT | Performed by: INTERNAL MEDICINE

## 2018-01-06 PROCEDURE — 84460 ALANINE AMINO (ALT) (SGPT): CPT | Performed by: INTERNAL MEDICINE

## 2018-01-08 DIAGNOSIS — E78.5 HYPERLIPIDEMIA LDL GOAL <70: ICD-10-CM

## 2018-01-08 DIAGNOSIS — I24.9 ACS (ACUTE CORONARY SYNDROME) (H): ICD-10-CM

## 2018-01-08 RX ORDER — ATORVASTATIN CALCIUM 40 MG/1
40 TABLET, FILM COATED ORAL AT BEDTIME
Qty: 90 TABLET | Refills: 3 | Status: SHIPPED | OUTPATIENT
Start: 2018-01-08 | End: 2018-09-07

## 2018-02-07 ENCOUNTER — MYC MEDICAL ADVICE (OUTPATIENT)
Dept: CARDIOLOGY | Facility: CLINIC | Age: 63
End: 2018-02-07

## 2018-02-07 DIAGNOSIS — I25.110 CORONARY ARTERY DISEASE INVOLVING NATIVE CORONARY ARTERY WITH UNSTABLE ANGINA PECTORIS (H): ICD-10-CM

## 2018-02-08 RX ORDER — NITROGLYCERIN 0.4 MG/1
TABLET SUBLINGUAL
Qty: 25 TABLET | Refills: 1 | Status: SHIPPED | OUTPATIENT
Start: 2018-02-08 | End: 2020-03-30

## 2018-07-31 ENCOUNTER — OFFICE VISIT (OUTPATIENT)
Dept: CARDIOLOGY | Facility: CLINIC | Age: 63
End: 2018-07-31
Attending: INTERNAL MEDICINE
Payer: COMMERCIAL

## 2018-07-31 VITALS
SYSTOLIC BLOOD PRESSURE: 128 MMHG | WEIGHT: 210.6 LBS | HEART RATE: 65 BPM | DIASTOLIC BLOOD PRESSURE: 83 MMHG | BODY MASS INDEX: 28.56 KG/M2 | OXYGEN SATURATION: 95 %

## 2018-07-31 DIAGNOSIS — I25.10 CORONARY ARTERY DISEASE INVOLVING NATIVE CORONARY ARTERY OF NATIVE HEART WITHOUT ANGINA PECTORIS: ICD-10-CM

## 2018-07-31 PROCEDURE — 99213 OFFICE O/P EST LOW 20 MIN: CPT | Performed by: INTERNAL MEDICINE

## 2018-07-31 NOTE — MR AVS SNAPSHOT
After Visit Summary   7/31/2018    Hai Redding    MRN: [id number]           Patient Information     Date Of Birth          1955        Visit Information        Provider Department      7/31/2018 2:30 PM Eliceo Valera MD [facility]        Today's Diagnoses     Coronary artery disease involving native coronary artery of native heart without angina pectoris           Follow-ups after your visit        Additional Services     Follow-Up with Cardiac Advanced Practice Provider                 Future tests that were ordered for you today     Open Future Orders        Priority Expected Expires Ordered    Follow-Up with Cardiac Advanced Practice Provider Routine 7/31/2019 8/1/2019 7/31/2018            Who to contact     If you have questions or need follow up information about today's clinic visit or your schedule please contact [facility] directly at [phone].  Normal or non-critical lab and imaging results will be communicated to you by [employer]hart, letter or phone within 4 business days after the clinic has received the results. If you do not hear from us within 7 days, please contact the clinic through [employer]hart or phone. If you have a critical or abnormal lab result, we will notify you by phone as soon as possible.  Submit refill requests through [employer] or call your pharmacy and they will forward the refill request to us. Please allow 3 business days for your refill to be completed.          Additional Information About Your Visit        MyChart Information     [employer] gives you secure access to your electronic health record. If you see a primary care provider, you can also send messages to your care team and make appointments. If you have questions, please call your primary care clinic.  If you do not have a primary care provider, please call [phone] and they will assist you.        Care EveryWhere ID      This is your Care EveryWhere ID. This could be used by other organizations to access your [place] medical records  [id number]        Your Vitals Were     Pulse Pulse Oximetry BMI (Body Mass Index)             65 95% 28.56 kg/m2          Blood Pressure from Last 3 Encounters:   07/31/18 128/83   08/22/17 119/76   07/14/17 138/90    Weight from Last 3 Encounters:   07/31/18 95.5 kg (210 lb 9.6 oz)   08/22/17 95.2 kg (209 lb 12.8 oz)   07/14/17 93 kg (205 lb)              We Performed the Following     Follow-Up with Cardiologist        Primary Care Provider Office Phone # Fax #    Eliceo Cast  [phone]       [facility] [address] [facility] [address]        Equal Access to Services     [employer] : Hadii timothy vasquez hadasho Sojuan diegoali, waaxda luqadaha, qaybta kaalmada adekarlie, anthony leonard . So [facility] [phone].    ATENCIÓN: Si habla español, tiene a marie disposición servicios gratuitos de asistencia lingüística. Elly al [phone].    We comply with applicable federal civil rights laws and [place] laws. We do not discriminate on the basis of race, color, national origin, age, disability, sex, sexual orientation, or gender identity.            Thank you!     Thank you for choosing [facility]  for your care. Our goal is always to provide you with excellent care. Hearing back from our patients is one way we can continue to improve our services. Please take a few minutes to complete the written survey that you may receive in the mail after your visit with us. Thank you!             Your Updated Medication List - Protect others around you: Learn how to safely use, store and throw away your medicines at www.disposemymeds.org.          This list is accurate as of 7/31/18  3:26 PM.  Always use your most recent med list.                   Brand Name Dispense Instructions for use Diagnosis     aspirin 81 MG chewable tablet     90 tablet    Take 1 tablet (81 mg) by mouth daily    ACS (acute coronary syndrome) (H)       atorvastatin 40 MG tablet    LIPITOR    90 tablet    Take 1 tablet (40 mg) by mouth At Bedtime    ACS (acute coronary syndrome) (H), Hyperlipidemia LDL goal <70       clopidogrel 75 MG tablet    PLAVIX    90 tablet    Take 1 tablet (75 mg) by mouth daily    CAD (coronary artery disease)       metoprolol tartrate 25 MG tablet    LOPRESSOR    180 tablet    Take 1 tablet (25 mg) by mouth 2 times daily    ACS (acute coronary syndrome) (H)       nitroGLYcerin 0.4 MG sublingual tablet    NITROSTAT    25 tablet    FOR CHEST PAIN PLACE 1 TABLET UNDER THE TONGUE EVERY 5 MINUTES FOR 3 DOSES- IF SYMPTOMS PERSIST 5 MINUTES AFTER 1ST DOSE CALL 911    Coronary artery disease involving native coronary artery with unstable angina pectoris (H)

## 2018-07-31 NOTE — PROGRESS NOTES
CARDIOLOGY VISIT    REASON FOR VISIT: f/u CAD    SUBJECTIVE:  62 y/o male seen for f/u of CAD.      In June 2017 he presented with NSTEMI, troponin 0.5. Coronary angiogram showed LAD with 80% stenosis with visible thrombus after a small D1, minimal disease of the circumflex and dominant RCA, he underwent single drug-eluting stent to the mid LAD.     Echo June 2017 showed EF 60%, normal wall motion, normal right ventricle, no valve disease, sinus of Valsalva 4.5 cm, ascending aorta 3.7 cm.      He has been doing well recently.  He plays tennis and softball and has no exertional shortness of breath or chest pain.  Blood pressure has not really been checked outside of clinic.  He denies any side effects from his medications or any bleeding issues.    MEDICATIONS:  Current Outpatient Prescriptions   Medication     aspirin 81 MG chewable tablet     atorvastatin (LIPITOR) 40 MG tablet     clopidogrel (PLAVIX) 75 MG tablet     metoprolol (LOPRESSOR) 25 MG tablet     nitroGLYcerin (NITROSTAT) 0.4 MG sublingual tablet     No current facility-administered medications for this visit.        ALLERGIES:  Allergies   Allergen Reactions     Shellfish Allergy      REVIEW OF SYSTEMS:  Constitutional:  No weight loss, fever, chills, weakness or fatigue.  HEENT:  Eyes:  No visual loss, blurred vision, double vision or yellow sclerae. No hearing loss, sneezing, congestion, runny nose or sore throat.  Skin:  No rash or itching.  Cardiovascular: per HPI  Respiratory: per HPI  GI:  No anorexia, nausea, vomiting or diarrhea. No abdominal pain or blood.  :  No dysurea, hematuria  Neurologic:  No headache, dizziness, syncope, paralysis, ataxia, numbness or tingling in the extremities. No change in bowel or bladder control.  Musculoskeletal:  No muscle, back pain, joint pain or stiffness.  Hematologic:  No anemia, bleeding or bruising.  Lymphatics:  No enlarged nodes. No history of splenectomy.  Psychiatric:  No history of depression or  anxiety.  Endocrine:  No reports of sweating, cold or heat intolerance. No polyuria or polydipsia.  Allergies:  No history of asthma, hives, eczema or rhinitis.    PHYSICAL EXAM:      BP: 128/83 Pulse: 65     SpO2: 95 %      Vital Signs with Ranges  Pulse:  [65] 65  BP: (128)/(83) 128/83  SpO2:  [95 %] 95 %  210 lbs 9.6 oz    Constitutional: awake, alert, no distress  Eyes: PERRL, sclera nonicteric  ENT: trachea midline  Respiratory: Lungs clear  Cardiovascular: Regular rate and rhythm, no murmurs  GI: nondistended, nontender, bowel sounds present  Lymph/Hematologic: no lymphadenopathy  Skin: dry, no rash  Musculoskeletal: good muscle tone, strength 5/5 in upper and lower extremities  Neurologic: no focal deficits  Neuropsychiatric: appropriate affact    DATA:  Lab: January 2018: Cholesterol 151, triglycerides 71, HDL 63, LDL 75    ASSESSMENT:  64 y/o male seen for f/u of CAD. He is doing well 1 year out from his stent.  We talked about the duration of clopidogrel. Because of the LAD location of the stent, he will stay on dual antiplatelet therapy for 1 more year.  However he could hold it for week if needed.       RECOMMENDATIONS:  1. CAD  - cont clopidogrel for one more year, OK to hold for any procedures  - cont ASA and metoprolol    2. Dyslipidemia  - controlled on pravastatin    F/u in one year.    Eliceo Valera MD  Cardiology - [facility]  Pager:  [phone]  Text Page  July 31, 2018

## 2018-07-31 NOTE — LETTER
7/31/2018    Eliceo Cast DO  [facility] [address] [address]    RE: Hai Redding       Dear Colleague,    I had the pleasure of seeing Hai Redding in the [facility].    CARDIOLOGY VISIT    REASON FOR VISIT: f/u CAD    SUBJECTIVE:  64 y/o male seen for f/u of CAD.      In June 2017 he presented with NSTEMI, troponin 0.5. Coronary angiogram showed LAD with 80% stenosis with visible thrombus after a small D1, minimal disease of the circumflex and dominant RCA, he underwent single drug-eluting stent to the mid LAD.     Echo June 2017 showed EF 60%, normal wall motion, normal right ventricle, no valve disease, sinus of Valsalva 4.5 cm, ascending aorta 3.7 cm.      He has been doing well recently.  He plays tennis and softball and has no exertional shortness of breath or chest pain.  Blood pressure has not really been checked outside of clinic.  He denies any side effects from his medications or any bleeding issues.    MEDICATIONS:  Current Outpatient Prescriptions   Medication     aspirin 81 MG chewable tablet     atorvastatin (LIPITOR) 40 MG tablet     clopidogrel (PLAVIX) 75 MG tablet     metoprolol (LOPRESSOR) 25 MG tablet     nitroGLYcerin (NITROSTAT) 0.4 MG sublingual tablet     No current facility-administered medications for this visit.        ALLERGIES:  Allergies   Allergen Reactions     Shellfish Allergy      REVIEW OF SYSTEMS:  Constitutional:  No weight loss, fever, chills, weakness or fatigue.  HEENT:  Eyes:  No visual loss, blurred vision, double vision or yellow sclerae. No hearing loss, sneezing, congestion, runny nose or sore throat.  Skin:  No rash or itching.  Cardiovascular: per HPI  Respiratory: per HPI  GI:  No anorexia, nausea, vomiting or diarrhea. No abdominal pain or blood.  :  No dysurea, hematuria  Neurologic:  No headache, dizziness, syncope, paralysis, ataxia, numbness or tingling in the extremities. No change  in bowel or bladder control.  Musculoskeletal:  No muscle, back pain, joint pain or stiffness.  Hematologic:  No anemia, bleeding or bruising.  Lymphatics:  No enlarged nodes. No history of splenectomy.  Psychiatric:  No history of depression or anxiety.  Endocrine:  No reports of sweating, cold or heat intolerance. No polyuria or polydipsia.  Allergies:  No history of asthma, hives, eczema or rhinitis.    PHYSICAL EXAM:      BP: 128/83 Pulse: 65     SpO2: 95 %      Vital Signs with Ranges  Pulse:  [65] 65  BP: (128)/(83) 128/83  SpO2:  [95 %] 95 %  210 lbs 9.6 oz    Constitutional: awake, alert, no distress  Eyes: PERRL, sclera nonicteric  ENT: trachea midline  Respiratory: Lungs clear  Cardiovascular: Regular rate and rhythm, no murmurs  GI: nondistended, nontender, bowel sounds present  Lymph/Hematologic: no lymphadenopathy  Skin: dry, no rash  Musculoskeletal: good muscle tone, strength 5/5 in upper and lower extremities  Neurologic: no focal deficits  Neuropsychiatric: appropriate affact    DATA:  Lab: January 2018: Cholesterol 151, triglycerides 71, HDL 63, LDL 75    ASSESSMENT:  64 y/o male seen for f/u of CAD. He is doing well 1 year out from his stent.  We talked about the duration of clopidogrel. Because of the LAD location of the stent, he will stay on dual antiplatelet therapy for 1 more year.  However he could hold it for week if needed.       RECOMMENDATIONS:  1. CAD  - cont clopidogrel for one more year, OK to hold for any procedures  - cont ASA and metoprolol    2. Dyslipidemia  - controlled on pravastatin    F/u in one year.    Eliceo Valera MD  Cardiology - [facility]  Pager:  [phone]  Text Page  July 31, 2018      Thank you for allowing me to participate in the care of your patient.      Sincerely,     Eliceo Valera MD     [facility]    cc:   Eliceo Valera MD  [facility]  [address] [place] [address]

## 2018-07-31 NOTE — LETTER
7/31/2018    Eliceo Cast DO  [facility] [address] [address]    RE: Hai Redding       Dear Colleague,    I had the pleasure of seeing Hai Redding in the [facility].    CARDIOLOGY VISIT    REASON FOR VISIT: f/u CAD    SUBJECTIVE:  64 y/o male seen for f/u of CAD.      In June 2017 he presented with NSTEMI, troponin 0.5. Coronary angiogram showed LAD with 80% stenosis with visible thrombus after a small D1, minimal disease of the circumflex and dominant RCA, he underwent single drug-eluting stent to the mid LAD.     Echo June 2017 showed EF 60%, normal wall motion, normal right ventricle, no valve disease, sinus of Valsalva 4.5 cm, ascending aorta 3.7 cm.      He has been doing well recently.  He plays tennis and softball and has no exertional shortness of breath or chest pain.  Blood pressure has not really been checked outside of clinic.  He denies any side effects from his medications or any bleeding issues.    MEDICATIONS:  Current Outpatient Prescriptions   Medication     aspirin 81 MG chewable tablet     atorvastatin (LIPITOR) 40 MG tablet     clopidogrel (PLAVIX) 75 MG tablet     metoprolol (LOPRESSOR) 25 MG tablet     nitroGLYcerin (NITROSTAT) 0.4 MG sublingual tablet     No current facility-administered medications for this visit.        ALLERGIES:  Allergies   Allergen Reactions     Shellfish Allergy      REVIEW OF SYSTEMS:  Constitutional:  No weight loss, fever, chills, weakness or fatigue.  HEENT:  Eyes:  No visual loss, blurred vision, double vision or yellow sclerae. No hearing loss, sneezing, congestion, runny nose or sore throat.  Skin:  No rash or itching.  Cardiovascular: per HPI  Respiratory: per HPI  GI:  No anorexia, nausea, vomiting or diarrhea. No abdominal pain or blood.  :  No dysurea, hematuria  Neurologic:  No headache, dizziness, syncope, paralysis, ataxia, numbness or tingling in the extremities. No change  in bowel or bladder control.  Musculoskeletal:  No muscle, back pain, joint pain or stiffness.  Hematologic:  No anemia, bleeding or bruising.  Lymphatics:  No enlarged nodes. No history of splenectomy.  Psychiatric:  No history of depression or anxiety.  Endocrine:  No reports of sweating, cold or heat intolerance. No polyuria or polydipsia.  Allergies:  No history of asthma, hives, eczema or rhinitis.    PHYSICAL EXAM:      BP: 128/83 Pulse: 65     SpO2: 95 %      Vital Signs with Ranges  Pulse:  [65] 65  BP: (128)/(83) 128/83  SpO2:  [95 %] 95 %  210 lbs 9.6 oz    Constitutional: awake, alert, no distress  Eyes: PERRL, sclera nonicteric  ENT: trachea midline  Respiratory: Lungs clear  Cardiovascular: Regular rate and rhythm, no murmurs  GI: nondistended, nontender, bowel sounds present  Lymph/Hematologic: no lymphadenopathy  Skin: dry, no rash  Musculoskeletal: good muscle tone, strength 5/5 in upper and lower extremities  Neurologic: no focal deficits  Neuropsychiatric: appropriate affact    DATA:  Lab: January 2018: Cholesterol 151, triglycerides 71, HDL 63, LDL 75    ASSESSMENT:  62 y/o male seen for f/u of CAD. He is doing well 1 year out from his stent.  We talked about the duration of clopidogrel. Because of the LAD location of the stent, he will stay on dual antiplatelet therapy for 1 more year.  However he could hold it for week if needed.       RECOMMENDATIONS:  1. CAD  - cont clopidogrel for one more year, OK to hold for any procedures  - cont ASA and metoprolol    2. Dyslipidemia  - controlled on pravastatin    F/u in one year.    Eliceo Valera MD  Cardiology - [facility]  Pager:  [phone]  Text Page  July 31, 2018      Thank you for allowing me to participate in the care of your patient.    Sincerely,     Eliceo Valera MD     [facility]

## 2018-08-29 ENCOUNTER — MYC MEDICAL ADVICE (OUTPATIENT)
Dept: CARDIOLOGY | Facility: CLINIC | Age: 63
End: 2018-08-29

## 2018-08-29 DIAGNOSIS — I25.10 CAD (CORONARY ARTERY DISEASE): ICD-10-CM

## 2018-08-29 RX ORDER — CLOPIDOGREL BISULFATE 75 MG/1
75 TABLET ORAL DAILY
Qty: 90 TABLET | Refills: 3 | Status: SHIPPED | OUTPATIENT
Start: 2018-08-29 | End: 2019-08-26

## 2018-09-07 DIAGNOSIS — E78.5 HYPERLIPIDEMIA LDL GOAL <70: ICD-10-CM

## 2018-09-07 DIAGNOSIS — I24.9 ACS (ACUTE CORONARY SYNDROME) (H): ICD-10-CM

## 2018-09-07 RX ORDER — METOPROLOL TARTRATE 25 MG/1
25 TABLET, FILM COATED ORAL 2 TIMES DAILY
Qty: 180 TABLET | Refills: 3 | Status: SHIPPED | OUTPATIENT
Start: 2018-09-07 | End: 2019-08-26

## 2018-09-07 RX ORDER — ATORVASTATIN CALCIUM 40 MG/1
40 TABLET, FILM COATED ORAL AT BEDTIME
Qty: 90 TABLET | Refills: 3 | Status: SHIPPED | OUTPATIENT
Start: 2018-09-07 | End: 2019-08-26

## 2018-10-25 ENCOUNTER — DOCUMENTATION ONLY (OUTPATIENT)
Dept: CARDIOLOGY | Facility: CLINIC | Age: 63
End: 2018-10-25

## 2018-10-25 DIAGNOSIS — I25.10 CAD (CORONARY ARTERY DISEASE): Primary | ICD-10-CM

## 2018-10-31 ENCOUNTER — HOSPITAL ENCOUNTER (OUTPATIENT)
Dept: CARDIOLOGY | Facility: CLINIC | Age: 63
Discharge: HOME | End: 2018-10-31
Attending: INTERNAL MEDICINE | Admitting: INTERNAL MEDICINE
Payer: COMMERCIAL

## 2018-10-31 DIAGNOSIS — I25.10 CAD (CORONARY ARTERY DISEASE): ICD-10-CM

## 2018-10-31 PROCEDURE — 93016 CV STRESS TEST SUPVJ ONLY: CPT | Performed by: FAMILY MEDICINE

## 2018-10-31 PROCEDURE — 93017 CV STRESS TEST TRACING ONLY: CPT | Performed by: INTERNAL MEDICINE

## 2018-10-31 PROCEDURE — 93018 CV STRESS TEST I&R ONLY: CPT | Performed by: FAMILY MEDICINE

## 2018-11-01 ENCOUNTER — OFFICE VISIT (OUTPATIENT)
Dept: CARDIOLOGY | Facility: CLINIC | Age: 63
End: 2018-11-01
Payer: COMMERCIAL

## 2018-11-01 ENCOUNTER — DOCUMENTATION ONLY (OUTPATIENT)
Dept: CARDIOLOGY | Facility: CLINIC | Age: 63
End: 2018-11-01

## 2018-11-01 VITALS
BODY MASS INDEX: 27.94 KG/M2 | WEIGHT: 206 LBS | HEART RATE: 54 BPM | SYSTOLIC BLOOD PRESSURE: 125 MMHG | OXYGEN SATURATION: 96 % | DIASTOLIC BLOOD PRESSURE: 78 MMHG

## 2018-11-01 DIAGNOSIS — I25.10 CORONARY ARTERY DISEASE INVOLVING NATIVE CORONARY ARTERY OF NATIVE HEART WITHOUT ANGINA PECTORIS: Primary | ICD-10-CM

## 2018-11-01 PROCEDURE — 99213 OFFICE O/P EST LOW 20 MIN: CPT | Performed by: INTERNAL MEDICINE

## 2018-11-01 NOTE — PROGRESS NOTES
CARDIOLOGY VISIT    REASON FOR VISIT: f/u CAD    SUBJECTIVE:  64 y/o male seen for f/u of CAD.       In June 2017 he presented with NSTEMI, troponin 0.5. Coronary angiogram showed LAD with 80% stenosis with visible thrombus after a small D1, minimal disease of the circumflex and dominant RCA, he underwent single drug-eluting stent to the mid LAD.    Echo June 2017 showed EF 60%, normal wall motion, normal right ventricle, no valve disease, sinus of Valsalva 4.5 cm, ascending aorta 3.7 cm.       Treadmill stress 10/2018 showed 13:00, 15 METS, 101% max HR, no chest pain, negative EKG.     He has been doing well the past several months.  He plays tennis and other sports and has no chest pain or other symptoms.  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  He may be looking for a place to live in [place] for the [place]z or may be year round.  His only complaint is some discomfort in his hips and knees.    This occurs more when he is sitting around, it gets better when he moves.    MEDICATIONS:  Current Outpatient Prescriptions   Medication     aspirin 81 MG chewable tablet     atorvastatin (LIPITOR) 40 MG tablet     clopidogrel (PLAVIX) 75 MG tablet     metoprolol tartrate (LOPRESSOR) 25 MG tablet     nitroGLYcerin (NITROSTAT) 0.4 MG sublingual tablet     No current facility-administered medications for this visit.        ALLERGIES:  Allergies   Allergen Reactions     Shellfish Allergy        REVIEW OF SYSTEMS:  Constitutional:  No weight loss, fever, chills, weakness or fatigue.  HEENT:  Eyes:  No visual loss, blurred vision, double vision or yellow sclerae. No hearing loss, sneezing, congestion, runny nose or sore throat.  Skin:  No rash or itching.  Cardiovascular: per HPI  Respiratory: per HPI  GI:  No anorexia, nausea, vomiting or diarrhea. No abdominal pain or blood.  :  No dysurea, hematuria  Neurologic:  No headache, dizziness, syncope, paralysis, ataxia, numbness or  tingling in the extremities. No change in bowel or bladder control.  Musculoskeletal:  No muscle, back pain, joint pain or stiffness.  Hematologic:  No anemia, bleeding or bruising.  Lymphatics:  No enlarged nodes. No history of splenectomy.  Psychiatric:  No history of depression or anxiety.  Endocrine:  No reports of sweating, cold or heat intolerance. No polyuria or polydipsia.  Allergies:  No history of asthma, hives, eczema or rhinitis.    PHYSICAL EXAM:  /78 (BP Location: Right arm, Patient Position: Sitting, Cuff Size: Adult Regular)  Pulse 54  Wt 93.4 kg (206 lb)  SpO2 96%  BMI 27.94 kg/m2  Constitutional: awake, alert, no distress  Eyes: PERRL, sclera nonicteric  ENT: trachea midline  Respiratory: Lungs clear  Cardiovascular: Regular rate and rhythm, no murmurs  GI: nondistended, nontender, bowel sounds present  Lymph/Hematologic: no lymphadenopathy  Skin: dry, no rash  Musculoskeletal: good muscle tone, strength 5/5 in upper and lower extremities  Neurologic: no focal deficits  Neuropsychiatric: appropriate affact    DATA:  Lab: January 2018: Cholesterol 152, HDL 63, LDL 75    ASSESSMENT:  63-year-old male seen for follow-up of coronary artery disease and DOT clearance.  He is doing well and has no concerning symptoms.  He has no shortness of breath or angina that would be worrisome for ischemia.  He did very well on his recent treadmill stress test achieving target heart rate, he had no symptoms, and EKG was normal.  He will follow-up with his DOT provider for overall clearance.    He may be having some statin myalgia from the atorvastatin.  He will try stopping it for a few weeks to see if he feels better.  Rosuvastatin could be tried as an alternative.    RECOMMENDATIONS:  1.  Coronary artery disease  -Continue clopidogrel through summer 2019, okay to hold for any procedures if needed  -Continue other cardiac medications    2.  DOT cardiac clearance  -Patient meets DOT criteria for driving.   He will need a repeat exercise treadmill stress test in 2 years if he continues to drive.    3.  Dyslipidemia  -Hold atorvastatin for 3-4 weeks to see if his knee and hip pain goes away, he could change to rosuvastatin 20 mg as an alternative, he would need repeat lipids if he changes    Follow-up in about 1 year.    Eliceo Valera MD  Cardiology - [facility]  Pager:  [phone]  Text Page  November 1, 2018

## 2018-11-01 NOTE — LETTER
11/1/2018    Eliceo Cast DO  [facility] [address] [address]    RE: Hai Redding       Dear Colleague,    I had the pleasure of seeing Hai Redding in the [facility] [place] [facility].    CARDIOLOGY VISIT    REASON FOR VISIT: f/u CAD    SUBJECTIVE:  64 y/o male seen for f/u of CAD.       In June 2017 he presented with NSTEMI, troponin 0.5. Coronary angiogram showed LAD with 80% stenosis with visible thrombus after a small D1, minimal disease of the circumflex and dominant RCA, he underwent single drug-eluting stent to the mid LAD.    Echo June 2017 showed EF 60%, normal wall motion, normal right ventricle, no valve disease, sinus of Valsalva 4.5 cm, ascending aorta 3.7 cm.       Treadmill stress 10/2018 showed 13:00, 15 METS, 101% max HR, no chest pain, negative EKG.     He has been doing well the past several months.  He plays tennis and other sports and has no chest pain or other symptoms.  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  He may be looking for a place to live in [place] for the [place] or may be year round.  His only complaint is some discomfort in his hips and knees.    This occurs more when he is sitting around, it gets better when he moves.    MEDICATIONS:  Current Outpatient Prescriptions   Medication     aspirin 81 MG chewable tablet     atorvastatin (LIPITOR) 40 MG tablet     clopidogrel (PLAVIX) 75 MG tablet     metoprolol tartrate (LOPRESSOR) 25 MG tablet     nitroGLYcerin (NITROSTAT) 0.4 MG sublingual tablet     No current facility-administered medications for this visit.        ALLERGIES:  Allergies   Allergen Reactions     Shellfish Allergy        REVIEW OF SYSTEMS:  Constitutional:  No weight loss, fever, chills, weakness or fatigue.  HEENT:  Eyes:  No visual loss, blurred vision, double vision or yellow sclerae. No hearing loss, sneezing, congestion, runny nose or sore throat.  Skin:  No  rash or itching.  Cardiovascular: per HPI  Respiratory: per HPI  GI:  No anorexia, nausea, vomiting or diarrhea. No abdominal pain or blood.  :  No dysurea, hematuria  Neurologic:  No headache, dizziness, syncope, paralysis, ataxia, numbness or tingling in the extremities. No change in bowel or bladder control.  Musculoskeletal:  No muscle, back pain, joint pain or stiffness.  Hematologic:  No anemia, bleeding or bruising.  Lymphatics:  No enlarged nodes. No history of splenectomy.  Psychiatric:  No history of depression or anxiety.  Endocrine:  No reports of sweating, cold or heat intolerance. No polyuria or polydipsia.  Allergies:  No history of asthma, hives, eczema or rhinitis.    PHYSICAL EXAM:  /78 (BP Location: Right arm, Patient Position: Sitting, Cuff Size: Adult Regular)  Pulse 54  Wt 93.4 kg (206 lb)  SpO2 96%  BMI 27.94 kg/m2  Constitutional: awake, alert, no distress  Eyes: PERRL, sclera nonicteric  ENT: trachea midline  Respiratory: Lungs clear  Cardiovascular: Regular rate and rhythm, no murmurs  GI: nondistended, nontender, bowel sounds present  Lymph/Hematologic: no lymphadenopathy  Skin: dry, no rash  Musculoskeletal: good muscle tone, strength 5/5 in upper and lower extremities  Neurologic: no focal deficits  Neuropsychiatric: appropriate affact    DATA:  Lab: January 2018: Cholesterol 152, HDL 63, LDL 75    ASSESSMENT:  63-year-old male seen for follow-up of coronary artery disease and DOT clearance.  He is doing well and has no concerning symptoms.  He has no shortness of breath or angina that would be worrisome for ischemia.  He did very well on his recent treadmill stress test achieving target heart rate, he had no symptoms, and EKG was normal.  He will follow-up with his DOT provider for overall clearance.    He may be having some statin myalgia from the atorvastatin.  He will try stopping it for a few weeks to see if he feels better.  Rosuvastatin could be tried as an  alternative.    RECOMMENDATIONS:  1.  Coronary artery disease  -Continue clopidogrel through summer 2019, okay to hold for any procedures if needed  -Continue other cardiac medications    2.  DOT cardiac clearance  -Patient meets DOT criteria for driving.  He will need a repeat exercise treadmill stress test in 2 years if he continues to drive.    3.  Dyslipidemia  -Hold atorvastatin for 3-4 weeks to see if his knee and hip pain goes away, he could change to rosuvastatin 20 mg as an alternative, he would need repeat lipids if he changes    Follow-up in about 1 year.    Eliceo Valera MD  Cardiology - [facility]  Pager:  [phone]  Text Page  November 1, 2018      Thank you for allowing me to participate in the care of your patient.    Sincerely,     Eliceo Valera MD     [facility]

## 2018-11-01 NOTE — MR AVS SNAPSHOT
After Visit Summary   11/1/2018    Hai Redding    MRN: [id number]           Patient Information     Date Of Birth          1955        Visit Information        Provider Department      11/1/2018 3:00 PM Eliceo Valera MD [facility]        Today's Diagnoses     Coronary artery disease involving native coronary artery of native heart without angina pectoris    -  1       Follow-ups after your visit        Additional Services     Follow-Up with Cardiologist                 Future tests that were ordered for you today     Open Future Orders        Priority Expected Expires Ordered    Follow-Up with Cardiologist Routine 11/1/2019 11/2/2019 11/1/2018            Who to contact     If you have questions or need follow up information about today's clinic visit or your schedule please contact [facility] directly at [phone].  Normal or non-critical lab and imaging results will be communicated to you by "[employer]hart, letter or phone within 4 business days after the clinic has received the results. If you do not hear from us within 7 days, please contact the clinic through "[employer]hart or phone. If you have a critical or abnormal lab result, we will notify you by phone as soon as possible.  Submit refill requests through [employer] or call your pharmacy and they will forward the refill request to us. Please allow 3 business days for your refill to be completed.          Additional Information About Your Visit        MyChart Information     [employer] gives you secure access to your electronic health record. If you see a primary care provider, you can also send messages to your care team and make appointments. If you have questions, please call your primary care clinic.  If you do not have a primary care provider, please call [phone] and they will assist you.        Care EveryWhere ID     This is your Care EveryWhere ID.  This could be used by other organizations to access your [facility] medical records  [id number]        Your Vitals Were     Pulse Pulse Oximetry BMI (Body Mass Index)             54 96% 27.94 kg/m2          Blood Pressure from Last 3 Encounters:   11/01/18 125/78   07/31/18 128/83   08/22/17 119/76    Weight from Last 3 Encounters:   11/01/18 93.4 kg (206 lb)   07/31/18 95.5 kg (210 lb 9.6 oz)   08/22/17 95.2 kg (209 lb 12.8 oz)               Primary Care Provider Office Phone # Fax #    Eliceo Cast [phone]       [address] [address]        Equal Access to Services     CLOVER CH : Jon marvin Soruben, waaxda luqadaha, qaybta kaalmada adeegyada, anthony raymond. So [facility] [phone].    ATENCIÓN: Si habla español, tiene a marie disposición servicios gratuitos de asistencia lingüística. Elly al [phone].    We comply with applicable federal civil rights laws and [place] laws. We do not discriminate on the basis of race, color, national origin, age, disability, sex, sexual orientation, or gender identity.            Thank you!     Thank you for choosing [facility]  for your care. Our goal is always to provide you with excellent care. Hearing back from our patients is one way we can continue to improve our services. Please take a few minutes to complete the written survey that you may receive in the mail after your visit with us. Thank you!             Your Updated Medication List - Protect others around you: Learn how to safely use, store and throw away your medicines at www.disposemymeds.org.          This list is accurate as of 11/1/18  3:21 PM.  Always use your most recent med list.                   Brand Name Dispense Instructions for use Diagnosis    aspirin 81 MG chewable tablet     90 tablet    Take 1 tablet (81 mg) by mouth daily    ACS (acute  coronary syndrome) (H)       atorvastatin 40 MG tablet    LIPITOR    90 tablet    Take 1 tablet (40 mg) by mouth At Bedtime    ACS (acute coronary syndrome) (H), Hyperlipidemia LDL goal <70       clopidogrel 75 MG tablet    PLAVIX    90 tablet    Take 1 tablet (75 mg) by mouth daily    CAD (coronary artery disease)       metoprolol tartrate 25 MG tablet    LOPRESSOR    180 tablet    Take 1 tablet (25 mg) by mouth 2 times daily    ACS (acute coronary syndrome) (H)       nitroGLYcerin 0.4 MG sublingual tablet    NITROSTAT    25 tablet    FOR CHEST PAIN PLACE 1 TABLET UNDER THE TONGUE EVERY 5 MINUTES FOR 3 DOSES- IF SYMPTOMS PERSIST 5 MINUTES AFTER 1ST DOSE CALL 911    Coronary artery disease involving native coronary artery with unstable angina pectoris (H)

## 2018-11-01 NOTE — PROGRESS NOTES
Pt saw Dr Valera today for follow up after GXT. [facility]. Pt's card expires in a few days. Form signed by Dr Valera and faxed to [facility]. Spoke with Marcela per phone and she confirmed she got the fax and will expedite the final process so patient can get his card renewed prior to expiration date. Form sent to scanning. Mary Vance RN Cardiology November 1, 2018, 3:25 PM

## 2018-11-26 DIAGNOSIS — I24.9 ACS (ACUTE CORONARY SYNDROME) (H): ICD-10-CM

## 2018-11-26 NOTE — TELEPHONE ENCOUNTER
Pending Prescriptions:                       Disp   Refills    metoprolol tartrate (LOPRESSOR) 25 MG tab*180 ta*3            Sig: Take 1 tablet (25 mg) by mouth 2 times daily    Not on list- pharmacy request for Lisinopril 10mg tabs    Last Visit 11/1/18 marita Valera  Last Filled 6/10/2017  Quantity 60/30  Req. Received 11/26/18  JACQUELINE McnallyA

## 2018-11-27 RX ORDER — METOPROLOL TARTRATE 25 MG/1
25 TABLET, FILM COATED ORAL 2 TIMES DAILY
Qty: 180 TABLET | Refills: 3 | OUTPATIENT
Start: 2018-11-27

## 2019-08-26 DIAGNOSIS — I25.10 CAD (CORONARY ARTERY DISEASE): ICD-10-CM

## 2019-08-26 DIAGNOSIS — E78.5 HYPERLIPIDEMIA LDL GOAL <70: ICD-10-CM

## 2019-08-26 DIAGNOSIS — I24.9 ACS (ACUTE CORONARY SYNDROME) (H): ICD-10-CM

## 2019-08-26 RX ORDER — METOPROLOL TARTRATE 25 MG/1
25 TABLET, FILM COATED ORAL 2 TIMES DAILY
Qty: 180 TABLET | Refills: 1 | Status: SHIPPED | OUTPATIENT
Start: 2019-08-26 | End: 2020-02-19

## 2019-08-26 RX ORDER — ATORVASTATIN CALCIUM 40 MG/1
40 TABLET, FILM COATED ORAL AT BEDTIME
Qty: 90 TABLET | Refills: 1 | Status: SHIPPED | OUTPATIENT
Start: 2019-08-26 | End: 2020-04-13

## 2019-08-26 RX ORDER — CLOPIDOGREL BISULFATE 75 MG/1
75 TABLET ORAL DAILY
Qty: 90 TABLET | Refills: 1 | Status: SHIPPED | OUTPATIENT
Start: 2019-08-26 | End: 2019-10-29

## 2019-10-29 ENCOUNTER — OFFICE VISIT (OUTPATIENT)
Dept: CARDIOLOGY | Facility: CLINIC | Age: 64
End: 2019-10-29
Attending: INTERNAL MEDICINE
Payer: COMMERCIAL

## 2019-10-29 ENCOUNTER — HOSPITAL ENCOUNTER (OUTPATIENT)
Dept: CARDIOLOGY | Facility: CLINIC | Age: 64
Discharge: HOME | End: 2019-10-29
Attending: INTERNAL MEDICINE | Admitting: INTERNAL MEDICINE
Payer: COMMERCIAL

## 2019-10-29 VITALS
WEIGHT: 202 LBS | HEART RATE: 63 BPM | HEIGHT: 71 IN | SYSTOLIC BLOOD PRESSURE: 129 MMHG | BODY MASS INDEX: 28.28 KG/M2 | DIASTOLIC BLOOD PRESSURE: 68 MMHG

## 2019-10-29 DIAGNOSIS — I25.10 CAD (CORONARY ARTERY DISEASE): ICD-10-CM

## 2019-10-29 DIAGNOSIS — I25.10 CAD (CORONARY ARTERY DISEASE): Primary | ICD-10-CM

## 2019-10-29 DIAGNOSIS — I25.10 CORONARY ARTERY DISEASE INVOLVING NATIVE CORONARY ARTERY OF NATIVE HEART WITHOUT ANGINA PECTORIS: ICD-10-CM

## 2019-10-29 PROCEDURE — 99214 OFFICE O/P EST MOD 30 MIN: CPT | Performed by: INTERNAL MEDICINE

## 2019-10-29 PROCEDURE — 93010 ELECTROCARDIOGRAM REPORT: CPT | Performed by: INTERNAL MEDICINE

## 2019-10-29 PROCEDURE — 93005 ELECTROCARDIOGRAM TRACING: CPT

## 2019-10-29 ASSESSMENT — MIFFLIN-ST. JEOR: SCORE: 1733.4

## 2019-10-29 NOTE — PATIENT INSTRUCTIONS
1. Stop clopidogrel.  Continue other medications.    2. If you renew DOT next year, you will need a stress test.

## 2019-10-29 NOTE — LETTER
10/29/2019    Eliceo Cast DO  [facility] [address] [address]    RE: Hai Redding       Dear Colleague,    I had the pleasure of seeing Hai Redding in the [facility].    CARDIOLOGY VISIT    REASON FOR VISIT: CAD    SUBJECTIVE:  63 y/o male seen for f/u of CAD.        In June 2017 he presented with NSTEMI, troponin 0.5. Coronary angiogram showed LAD with 80% stenosis with visible thrombus after a small D1, minimal disease of the circumflex and dominant RCA, he underwent single drug-eluting stent to the mid LAD.     Echo June 2017 showed EF 60%, normal wall motion, normal right ventricle, no valve disease, sinus of Valsalva 4.5 cm, ascending aorta 3.7 cm.        Treadmill stress 10/2018 showed 13:00, 15 METS, 101% max HR, no chest pain, negative EKG.     He has been doing very well recently.  He plays on a masters level softball team, place tennis, and does other activities with no exertional shortness of breath or chest pain.  Blood pressure has been normal on home checks.    MEDICATIONS:  Current Outpatient Medications   Medication     aspirin 81 MG chewable tablet     atorvastatin (LIPITOR) 40 MG tablet     clopidogrel (PLAVIX) 75 MG tablet     metoprolol tartrate (LOPRESSOR) 25 MG tablet     nitroGLYcerin (NITROSTAT) 0.4 MG sublingual tablet     No current facility-administered medications for this visit.        ALLERGIES:  Allergies   Allergen Reactions     Shellfish Allergy        REVIEW OF SYSTEMS:  Constitutional:  No weight loss, fever, chills, weakness or fatigue.  HEENT:  Eyes:  No visual loss, blurred vision, double vision or yellow sclerae. No hearing loss, sneezing, congestion, runny nose or sore throat.  Skin:  No rash or itching.  Cardiovascular: per HPI  Respiratory: per HPI  GI:  No anorexia, nausea, vomiting or diarrhea. No abdominal pain or blood.  :  No dysurea, hematuria  Neurologic:  No headache, dizziness, syncope,  "paralysis, ataxia, numbness or tingling in the extremities. No change in bowel or bladder control.  Musculoskeletal:  No muscle, back pain, joint pain or stiffness.  Hematologic:  No anemia, bleeding or bruising.  Lymphatics:  No enlarged nodes. No history of splenectomy.  Psychiatric:  No history of depression or anxiety.  Endocrine:  No reports of sweating, cold or heat intolerance. No polyuria or polydipsia.  Allergies:  No history of asthma, hives, eczema or rhinitis.    PHYSICAL EXAM:  /68   Pulse 63   Ht 1.803 m (5' 11\")   Wt 91.6 kg (202 lb)   BMI 28.17 kg/m     Constitutional: awake, alert, no distress  Eyes: PERRL, sclera nonicteric  ENT: trachea midline  Respiratory: Lungs clear  Cardiovascular: Regular rate and rhythm, no murmurs  GI: nondistended, nontender, bowel sounds present  Lymph/Hematologic: no lymphadenopathy  Skin: dry, no rash  Musculoskeletal: good muscle tone, strength 5/5 in upper and lower extremities  Neurologic: no focal deficits  Neuropsychiatric: appropriate affact    DATA:  Lab:   Recent Labs   Lab Test 01/06/18  0741 08/31/17  0841   CHOL 152 185   HDL 63 59   LDL 75 108*   TRIG 71 91       EKG: October 29, 2019: Sinus rhythm, rate 60, normal EKG    ASSESSMENT:  64-year-old male seen for follow-up of coronary artery disease.  He is very physically active and has no concerning symptoms.  Blood pressure is well controlled.  It has been over 2 years from his stent, therefore clopidogrel will be discontinued.    He continues to need DOT clearance.  He currently meets all criteria.  He has no concerning cardiac symptoms.  He needs a stress test every 2 years, last was October 2018.  If he renews his DOT next year, he will need a stress test in late 2020.    RECOMMENDATIONS:  1.  Coronary artery disease   - discontinue clopidogrel, continue other medications    2. DOT cardiovascular examination  -Patient has no concerning cardiac symptoms or findings, no additional cardiac testing " required at this point  -If he renews his DOT next year, he will need a stress test in late 2020    3.  Mild to moderate aortic root dilation  -Repeat echo next year on stress test    Follow-up in 1 year with stress test.  If patient does not renew his DOT, he will cancel the stress test.    Eliceo Valera MD  Cardiology - [facility]  Pager:  [phone]  Text Page  October 29, 2019      Thank you for allowing me to participate in the care of your patient.    Sincerely,     Eliceo Valera MD     [facility]

## 2019-10-29 NOTE — PROGRESS NOTES
CARDIOLOGY VISIT    REASON FOR VISIT: CAD    SUBJECTIVE:  65 y/o male seen for f/u of CAD.        In June 2017 he presented with NSTEMI, troponin 0.5. Coronary angiogram showed LAD with 80% stenosis with visible thrombus after a small D1, minimal disease of the circumflex and dominant RCA, he underwent single drug-eluting stent to the mid LAD.     Echo June 2017 showed EF 60%, normal wall motion, normal right ventricle, no valve disease, sinus of Valsalva 4.5 cm, ascending aorta 3.7 cm.        Treadmill stress 10/2018 showed 13:00, 15 METS, 101% max HR, no chest pain, negative EKG.     He has been doing very well recently.  He plays on a masters level softball team, place tennis, and does other activities with no exertional shortness of breath or chest pain.  Blood pressure has been normal on home checks.    MEDICATIONS:  Current Outpatient Medications   Medication     aspirin 81 MG chewable tablet     atorvastatin (LIPITOR) 40 MG tablet     clopidogrel (PLAVIX) 75 MG tablet     metoprolol tartrate (LOPRESSOR) 25 MG tablet     nitroGLYcerin (NITROSTAT) 0.4 MG sublingual tablet     No current facility-administered medications for this visit.        ALLERGIES:  Allergies   Allergen Reactions     Shellfish Allergy        REVIEW OF SYSTEMS:  Constitutional:  No weight loss, fever, chills, weakness or fatigue.  HEENT:  Eyes:  No visual loss, blurred vision, double vision or yellow sclerae. No hearing loss, sneezing, congestion, runny nose or sore throat.  Skin:  No rash or itching.  Cardiovascular: per HPI  Respiratory: per HPI  GI:  No anorexia, nausea, vomiting or diarrhea. No abdominal pain or blood.  :  No dysurea, hematuria  Neurologic:  No headache, dizziness, syncope, paralysis, ataxia, numbness or tingling in the extremities. No change in bowel or bladder control.  Musculoskeletal:  No muscle, back pain, joint pain or stiffness.  Hematologic:  No anemia, bleeding or bruising.  Lymphatics:  No enlarged nodes. No  "history of splenectomy.  Psychiatric:  No history of depression or anxiety.  Endocrine:  No reports of sweating, cold or heat intolerance. No polyuria or polydipsia.  Allergies:  No history of asthma, hives, eczema or rhinitis.    PHYSICAL EXAM:  /68   Pulse 63   Ht 1.803 m (5' 11\")   Wt 91.6 kg (202 lb)   BMI 28.17 kg/m    Constitutional: awake, alert, no distress  Eyes: PERRL, sclera nonicteric  ENT: trachea midline  Respiratory: Lungs clear  Cardiovascular: Regular rate and rhythm, no murmurs  GI: nondistended, nontender, bowel sounds present  Lymph/Hematologic: no lymphadenopathy  Skin: dry, no rash  Musculoskeletal: good muscle tone, strength 5/5 in upper and lower extremities  Neurologic: no focal deficits  Neuropsychiatric: appropriate affact    DATA:  Lab:   Recent Labs   Lab Test 01/06/18  0741 08/31/17  0841   CHOL 152 185   HDL 63 59   LDL 75 108*   TRIG 71 91       EKG: October 29, 2019: Sinus rhythm, rate 60, normal EKG    ASSESSMENT:  64-year-old male seen for follow-up of coronary artery disease.  He is very physically active and has no concerning symptoms.  Blood pressure is well controlled.  It has been over 2 years from his stent, therefore clopidogrel will be discontinued.    He continues to need DOT clearance.  He currently meets all criteria.  He has no concerning cardiac symptoms.  He needs a stress test every 2 years, last was October 2018.  If he renews his DOT next year, he will need a stress test in late 2020.    RECOMMENDATIONS:  1.  Coronary artery disease   - discontinue clopidogrel, continue other medications    2. DOT cardiovascular examination  -Patient has no concerning cardiac symptoms or findings, no additional cardiac testing required at this point  -If he renews his DOT next year, he will need a stress test in late 2020    3.  Mild to moderate aortic root dilation  -Repeat echo next year on stress test    Follow-up in 1 year with stress test.  If patient does not renew " his DOT, he will cancel the stress test.    Eliceo Valera MD  Cardiology - [facility]  Pager:  [phone]  Text Page  October 29, 2019

## 2019-11-08 ENCOUNTER — HEALTH MAINTENANCE LETTER (OUTPATIENT)
Age: 64
End: 2019-11-08

## 2019-11-18 ENCOUNTER — MYC MEDICAL ADVICE (OUTPATIENT)
Dept: CARDIOLOGY | Facility: CLINIC | Age: 64
End: 2019-11-18

## 2020-02-19 DIAGNOSIS — I24.9 ACS (ACUTE CORONARY SYNDROME) (H): ICD-10-CM

## 2020-02-19 RX ORDER — METOPROLOL TARTRATE 25 MG/1
25 TABLET, FILM COATED ORAL 2 TIMES DAILY
Qty: 180 TABLET | Refills: 3 | Status: SHIPPED | OUTPATIENT
Start: 2020-02-19 | End: 2021-02-10

## 2020-03-29 ENCOUNTER — MYC MEDICAL ADVICE (OUTPATIENT)
Dept: CARDIOLOGY | Facility: CLINIC | Age: 65
End: 2020-03-29

## 2020-03-29 DIAGNOSIS — I25.110 CORONARY ARTERY DISEASE INVOLVING NATIVE CORONARY ARTERY WITH UNSTABLE ANGINA PECTORIS (H): ICD-10-CM

## 2020-03-30 RX ORDER — NITROGLYCERIN 0.4 MG/1
0.4 TABLET SUBLINGUAL EVERY 5 MIN PRN
Qty: 25 TABLET | Refills: 1 | Status: SHIPPED | OUTPATIENT
Start: 2020-03-30 | End: 2022-10-19

## 2020-03-30 NOTE — TELEPHONE ENCOUNTER
Received refill request for:  Nitro  Last OV was: 10/29/19 w/    Labs/EKG: n/a   F/U scheduled: Orders for follow up 10/2020   New script sent to: [facility]

## 2020-04-13 DIAGNOSIS — E78.5 HYPERLIPIDEMIA LDL GOAL <70: ICD-10-CM

## 2020-04-13 DIAGNOSIS — I24.9 ACS (ACUTE CORONARY SYNDROME) (H): ICD-10-CM

## 2020-04-13 RX ORDER — ATORVASTATIN CALCIUM 40 MG/1
40 TABLET, FILM COATED ORAL AT BEDTIME
Qty: 90 TABLET | Refills: 2 | Status: SHIPPED | OUTPATIENT
Start: 2020-04-13 | End: 2021-02-10

## 2020-10-13 ENCOUNTER — TELEPHONE (OUTPATIENT)
Dept: CARDIOLOGY | Facility: CLINIC | Age: 65
End: 2020-10-13

## 2020-10-13 NOTE — TELEPHONE ENCOUNTER
[facility]  Per Dr Valera last note 10/29/19:  -If he renews his DOT next year, he will need a stress test in late 2020    Advised pt that he will eed to schedule stress and follow up for cardiac clearance.    Pt states last stress test cost him $800 and he does not understand why he needs to to this.    Explained this is required by licensing board and not cardiology.    Pt verbalized understanding, declined to make any appointments at this time.  MILLIE HERNÁNDEZ RN on 10/13/2020 at 11:27 AM

## 2020-12-06 ENCOUNTER — HEALTH MAINTENANCE LETTER (OUTPATIENT)
Age: 65
End: 2020-12-06

## 2020-12-15 ENCOUNTER — HOSPITAL ENCOUNTER (OUTPATIENT)
Dept: CARDIOLOGY | Facility: CLINIC | Age: 65
Discharge: HOME | End: 2020-12-15
Attending: INTERNAL MEDICINE | Admitting: INTERNAL MEDICINE
Payer: COMMERCIAL

## 2020-12-15 DIAGNOSIS — I25.10 CORONARY ARTERY DISEASE INVOLVING NATIVE CORONARY ARTERY OF NATIVE HEART WITHOUT ANGINA PECTORIS: ICD-10-CM

## 2020-12-15 PROCEDURE — 93321 DOPPLER ECHO F-UP/LMTD STD: CPT | Mod: 26 | Performed by: INTERNAL MEDICINE

## 2020-12-15 PROCEDURE — 93325 DOPPLER ECHO COLOR FLOW MAPG: CPT | Mod: 26 | Performed by: INTERNAL MEDICINE

## 2020-12-15 PROCEDURE — 93350 STRESS TTE ONLY: CPT | Mod: 26 | Performed by: INTERNAL MEDICINE

## 2020-12-15 PROCEDURE — 93018 CV STRESS TEST I&R ONLY: CPT | Performed by: INTERNAL MEDICINE

## 2020-12-15 PROCEDURE — 93016 CV STRESS TEST SUPVJ ONLY: CPT | Performed by: FAMILY MEDICINE

## 2020-12-15 PROCEDURE — 93321 DOPPLER ECHO F-UP/LMTD STD: CPT | Mod: TC

## 2020-12-16 ENCOUNTER — VIRTUAL VISIT (OUTPATIENT)
Dept: CARDIOLOGY | Facility: CLINIC | Age: 65
End: 2020-12-16
Attending: INTERNAL MEDICINE
Payer: COMMERCIAL

## 2020-12-16 DIAGNOSIS — I25.10 CORONARY ARTERY DISEASE INVOLVING NATIVE CORONARY ARTERY OF NATIVE HEART WITHOUT ANGINA PECTORIS: ICD-10-CM

## 2020-12-16 PROCEDURE — 99213 OFFICE O/P EST LOW 20 MIN: CPT | Mod: 95 | Performed by: INTERNAL MEDICINE

## 2020-12-16 NOTE — PROGRESS NOTES
"Hai Redding is a 65 year old male who is being evaluated via a billable telephone visit.      The patient has been notified of following:     \"This telephone visit will be conducted via a call between you and your physician/provider. We have found that certain health care needs can be provided without the need for a physical exam.  This service lets us provide the care you need with a short phone conversation.  If a prescription is necessary we can send it directly to your pharmacy.  If lab work is needed we can place an order for that and you can then stop by our lab to have the test done at a later time.    Telephone visits are billed at different rates depending on your insurance coverage. During this emergency period, for some insurers they may be billed the same as an in-person visit.  Please reach out to your insurance provider with any questions.    If during the course of the call the physician/provider feels a telephone visit is not appropriate, you will not be charged for this service.\"    Patient has given verbal consent for Telephone visit?  Yes    What phone number would you like to be contacted at? [phone]    How would you like to obtain your AVS? MyChart    Additional provider notes:  66 y/o male seen for f/u of CAD and aortic root dilation.     In June 2017 he presented with NSTEMI, troponin 0.5. Coronary angiogram showed LAD with 80% stenosis with visible thrombus after a small D1, minimal disease of the circumflex and dominant RCA, he underwent single drug-eluting stent to the mid LAD.       Echo June 2017 showed EF 60%, normal wall motion, normal right ventricle, no valve disease, sinus of Valsalva 4.5 cm, ascending aorta 3.7 cm.         Treadmill stress 10/2018 showed 13:00, 15 METS, 101% max HR, no chest pain, negative EKG.    He has been doing well the past 1 year.  He is quite active playing softball denies any chest pain or shortness of breath.  Blood pressure has not been checked " outside of clinic.  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    Treadmill stress echo December 2020 showed no evidence of ischemia.    Agree with ROS as above.    Data:  Recent Labs   Lab Test 01/06/18  0741 08/31/17  0841   CHOL 152 185   HDL 63 59   LDL 75 108*   TRIG 71 91       Assessment:  65-year-old male seen for follow-up of coronary disease and dilated aortic root.  He has no concerning cardiac symptoms.  His recent stress test was normal.  He should meet DOT criteria.  He will need follow-up of his dilated aortic root.  Echo will be done next year.  He was encouraged to check his blood pressure regularly at home.    Recommendations:  1.  Coronary artery disease  -Continue current medications  -He will need a treadmill stress echo every 2 years if he continues to work    2.  Dilated aortic root  -Repeat echo on follow-up next year  -Strict blood pressure control, goal 130/80 or less    Follow-up in 1 year after echo, check BMP and fasting lipids.    Phone call duration: 13 minutes    A total of 20 minutes was spent with clinic visit, including chart review and coordinating care, >50% of time was spent talking with patient.    Eliceo Valera MD  Cardiology - [facility]  Pager: [phone]  Text Page  December 16, 2020

## 2020-12-16 NOTE — LETTER
[facility] [place]  [address] [address]  [phone]    December 16, 2020    RE:  Hai Redding                                                                                                                                                       [address] [facility] [address]      To whom it may concern,    Mr. Hai Redding is under my cardiac care with his history of coronary artery disease and a stent in 2017.  He is doing well and has no concerning cardiac symptoms.  He completed a treadmill stress echo December 2020 which showed a good functional capacity, no chest pain, normal EKG, and no echo evidence of ischemia.  Based on this he should meet cardiac criteria for DOT clearance.    Please contact my office with any questions or concerns.      Sincerely,      Eliceo Valera MD  Cardiology - [facility]  December 16, 2020

## 2020-12-16 NOTE — PATIENT INSTRUCTIONS
It was a pleasure speaking with you during our recent phone visit.  It sounds like you are doing well.  Your recent stress test is normal, you should meet DOT criteria from a heart perspective.    During the stress test on the echocardiogram, it was noted that the aorta is dilated.  This is the main blood vessel coming off the top of the heart.  It is very important to keep your blood pressure controlled so this does not enlarge further.  Please check your blood pressure at home, if it is running over 130/80 regularly, please call our clinic and you may need more blood pressure medication.    I would like to see you back in 1 year's time.  We will check blood work including cholesterol and also repeat an echocardiogram to follow-up on the heart function and the size of the aorta.

## 2020-12-16 NOTE — LETTER
"12/16/2020    Eliceo Sallie Anjelicaon, DO  No address on file    RE: Hai Redding       Dear Colleague,    I had the pleasure of seeing Hai Redding in the [facility].    Hai Redding is a 65 year old male who is being evaluated via a billable telephone visit.      The patient has been notified of following:     \"This telephone visit will be conducted via a call between you and your physician/provider. We have found that certain health care needs can be provided without the need for a physical exam.  This service lets us provide the care you need with a short phone conversation.  If a prescription is necessary we can send it directly to your pharmacy.  If lab work is needed we can place an order for that and you can then stop by our lab to have the test done at a later time.    Telephone visits are billed at different rates depending on your insurance coverage. During this emergency period, for some insurers they may be billed the same as an in-person visit.  Please reach out to your insurance provider with any questions.    If during the course of the call the physician/provider feels a telephone visit is not appropriate, you will not be charged for this service.\"    Patient has given verbal consent for Telephone visit?  Yes    What phone number would you like to be contacted at? [phone]    How would you like to obtain your AVS? Haileyhart    Additional provider notes:  64 y/o male seen for f/u of CAD and aortic root dilation.     In June 2017 he presented with NSTEMI, troponin 0.5. Coronary angiogram showed LAD with 80% stenosis with visible thrombus after a small D1, minimal disease of the circumflex and dominant RCA, he underwent single drug-eluting stent to the mid LAD.       Echo June 2017 showed EF 60%, normal wall motion, normal right ventricle, no valve disease, sinus of Valsalva 4.5 cm, ascending aorta 3.7 cm.         Treadmill stress 10/2018 showed 13:00, 15 METS, " 101% max HR, no chest pain, negative EKG.    He has been doing well the past 1 year.  He is quite active playing softball denies any chest pain or shortness of breath.  Blood pressure has not been checked outside of clinic.  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    Treadmill stress echo December 2020 showed no evidence of ischemia.    Agree with ROS as above.    Data:  Recent Labs   Lab Test 01/06/18  0741 08/31/17  0841   CHOL 152 185   HDL 63 59   LDL 75 108*   TRIG 71 91       Assessment:  65-year-old male seen for follow-up of coronary disease and dilated aortic root.  He has no concerning cardiac symptoms.  His recent stress test was normal.  He should meet DOT criteria.  He will need follow-up of his dilated aortic root.  Echo will be done next year.  He was encouraged to check his blood pressure regularly at home.    Recommendations:  1.  Coronary artery disease  -Continue current medications  -He will need a treadmill stress echo every 2 years if he continues to work    2.  Dilated aortic root  -Repeat echo on follow-up next year  -Strict blood pressure control, goal 130/80 or less    Follow-up in 1 year after echo, check BMP and fasting lipids.    Phone call duration: 13 minutes    A total of 20 minutes was spent with clinic visit, including chart review and coordinating care, >50% of time was spent talking with patient.    Eliceo Valera MD  Cardiology - [facility]  Pager: [phone]  Text Page  December 16, 2020    cc:   Eliceo Valera MD  [facility]  [address] [place] [address]

## 2021-02-10 DIAGNOSIS — I24.9 ACS (ACUTE CORONARY SYNDROME) (H): ICD-10-CM

## 2021-02-10 DIAGNOSIS — E78.5 HYPERLIPIDEMIA LDL GOAL <70: ICD-10-CM

## 2021-02-10 RX ORDER — ATORVASTATIN CALCIUM 40 MG/1
40 TABLET, FILM COATED ORAL AT BEDTIME
Qty: 90 TABLET | Refills: 3 | Status: SHIPPED | OUTPATIENT
Start: 2021-02-10 | End: 2022-03-28

## 2021-02-10 RX ORDER — METOPROLOL TARTRATE 25 MG/1
25 TABLET, FILM COATED ORAL 2 TIMES DAILY
Qty: 180 TABLET | Refills: 3 | Status: SHIPPED | OUTPATIENT
Start: 2021-02-10 | End: 2022-03-28

## 2021-04-11 ENCOUNTER — HEALTH MAINTENANCE LETTER (OUTPATIENT)
Age: 66
End: 2021-04-11

## 2021-09-25 ENCOUNTER — HEALTH MAINTENANCE LETTER (OUTPATIENT)
Age: 66
End: 2021-09-25

## 2022-03-28 DIAGNOSIS — E78.5 HYPERLIPIDEMIA LDL GOAL <70: ICD-10-CM

## 2022-03-28 DIAGNOSIS — I24.9 ACS (ACUTE CORONARY SYNDROME) (H): ICD-10-CM

## 2022-03-28 RX ORDER — ATORVASTATIN CALCIUM 40 MG/1
40 TABLET, FILM COATED ORAL AT BEDTIME
Qty: 90 TABLET | Refills: 0 | Status: SHIPPED | OUTPATIENT
Start: 2022-03-28 | End: 2022-07-08

## 2022-03-28 RX ORDER — METOPROLOL TARTRATE 25 MG/1
25 TABLET, FILM COATED ORAL 2 TIMES DAILY
Qty: 180 TABLET | Refills: 0 | Status: SHIPPED | OUTPATIENT
Start: 2022-03-28 | End: 2022-07-08

## 2022-03-28 NOTE — TELEPHONE ENCOUNTER
[facility] Cardiology Refill Guideline reviewed.  Patient overdue for annual appointment.  90 day lobito refill given.  Patient advised by phone that no further refills will be given without appointment.  Patient verbalized understanding.  Cecilia Caldwell RN on 3/28/2022 at 3:59 PM

## 2022-05-07 ENCOUNTER — HEALTH MAINTENANCE LETTER (OUTPATIENT)
Age: 67
End: 2022-05-07

## 2022-07-06 ENCOUNTER — MYC MEDICAL ADVICE (OUTPATIENT)
Dept: CARDIOLOGY | Facility: CLINIC | Age: 67
End: 2022-07-06

## 2022-07-06 DIAGNOSIS — I24.9 ACS (ACUTE CORONARY SYNDROME) (H): ICD-10-CM

## 2022-07-06 DIAGNOSIS — E78.5 HYPERLIPIDEMIA LDL GOAL <70: ICD-10-CM

## 2022-07-07 NOTE — TELEPHONE ENCOUNTER
Cecilia Caldwell, RN     JL    3/28/22 3:59 PM  Note  [facility] Cardiology Refill Guideline reviewed.  Patient overdue for annual appointment.  90 day lobito refill given.  Patient advised by phone that no further refills will be given without appointment.  Patient verbalized understanding.  Cecilia Caldwell, RN on 3/28/2022 at 3:59 PM

## 2022-07-07 NOTE — TELEPHONE ENCOUNTER
Patient now has appt scheduled 10/19/22.  Requesting refills to bridge him until then.     ADDENDUM: 90 day refill sent on metoprolol and atorvastatin. Pt notified via [employer]. Mary Vance RN Cardiology July 8, 2022, 7:55 AM

## 2022-07-08 RX ORDER — METOPROLOL TARTRATE 25 MG/1
25 TABLET, FILM COATED ORAL 2 TIMES DAILY
Qty: 180 TABLET | Refills: 0 | Status: SHIPPED | OUTPATIENT
Start: 2022-07-08 | End: 2022-10-07

## 2022-07-08 RX ORDER — ATORVASTATIN CALCIUM 40 MG/1
40 TABLET, FILM COATED ORAL AT BEDTIME
Qty: 90 TABLET | Refills: 0 | Status: SHIPPED | OUTPATIENT
Start: 2022-07-08 | End: 2022-10-07

## 2022-10-06 ENCOUNTER — MYC MEDICAL ADVICE (OUTPATIENT)
Dept: CARDIOLOGY | Facility: CLINIC | Age: 67
End: 2022-10-06

## 2022-10-06 DIAGNOSIS — E78.5 HYPERLIPIDEMIA LDL GOAL <70: ICD-10-CM

## 2022-10-06 DIAGNOSIS — I24.9 ACS (ACUTE CORONARY SYNDROME) (H): ICD-10-CM

## 2022-10-07 RX ORDER — METOPROLOL TARTRATE 25 MG/1
25 TABLET, FILM COATED ORAL 2 TIMES DAILY
Qty: 180 TABLET | Refills: 0 | Status: SHIPPED | OUTPATIENT
Start: 2022-10-07 | End: 2022-10-19

## 2022-10-07 RX ORDER — ATORVASTATIN CALCIUM 40 MG/1
40 TABLET, FILM COATED ORAL AT BEDTIME
Qty: 90 TABLET | Refills: 0 | Status: SHIPPED | OUTPATIENT
Start: 2022-10-07 | End: 2022-10-19

## 2022-10-07 NOTE — TELEPHONE ENCOUNTER
[facility] Cardiology Refill Guideline reviewed.  Medication meets criteria for refill. Pt scheduled for 10/19/22 with Dr Valera. Refill provided. Mary Vance RN Cardiology October 7, 2022, 7:59 AM

## 2022-10-17 NOTE — PROGRESS NOTES
CARDIOLOGY VISIT    REASON FOR VISIT: CAD    SUBJECTIVE:  68 y/o male seen for f/u of CAD and aortic root dilation.     In June 2017 he presented with NSTEMI, troponin 0.5. Coronary angiogram showed LAD with 80% stenosis with visible thrombus after a small D1, minimal disease of the circumflex and dominant RCA, he underwent single drug-eluting stent to the mid LAD.        Echo June 2017 showed EF 60%, normal wall motion, normal right ventricle, no valve disease, sinus of Valsalva 4.5 cm, ascending aorta 3.7 cm.         Treadmill stress 10/2018 showed 13:00, 15 METS, 101% max HR, no chest pain, negative EKG.    Treadmill stress echo December 2020 showed no evidence of ischemia.    Overall he been doing quite well.  He travels a lot and plays competitive softball.  He has no exertional chest pain.  His main issue has been some diffuse muscle and joint aches.  His primary physician put him on a short course of prednisone, he felt markedly better, but symptoms returned when he stopped the prednisone.    MEDICATIONS:  Current Outpatient Medications   Medication     aspirin 81 MG chewable tablet     atorvastatin (LIPITOR) 40 MG tablet     metoprolol tartrate (LOPRESSOR) 25 MG tablet     nitroGLYcerin (NITROSTAT) 0.4 MG sublingual tablet     No current facility-administered medications for this visit.       ALLERGIES:  Allergies   Allergen Reactions     Shellfish Allergy        REVIEW OF SYSTEMS:  Constitutional:  No weight loss, fever, chills  HEENT:  Eyes:  No visual loss, blurred vision, double vision or yellow sclerae. No hearing loss, sneezing, congestion, runny nose or sore throat.  Skin:  No rash or itching.  Cardiovascular: per HPI  Respiratory: per HPI  GI:  No anorexia, nausea, vomiting or diarrhea. No abdominal pain or blood.  :  No dysurea, hematuria  Neurologic:  No headache, paralysis, ataxia, numbness or tingling in the extremities. No change in bowel or bladder control.  Musculoskeletal:  No muscle  pain  Hematologic:  No bleeding or bruising.  Lymphatics:  No enlarged nodes. No history of splenectomy.  Endocrine:  No reports of sweating, cold or heat intolerance. No polyuria or polydipsia.  Allergies:  No history of asthma, hives, eczema or rhinitis.    PHYSICAL EXAM:  /83 (BP Location: Right arm, Patient Position: Sitting, Cuff Size: Adult Regular)   Pulse 54   Resp 12   Wt 89.1 kg (196 lb 6.4 oz)   SpO2 99%   BMI 27.39 kg/m      Constitutional: awake, alert, no distress  Eyes: PERRL, sclera nonicteric  ENT: trachea midline  Respiratory: Lungs clear  Cardiovascular: Regular rate and rhythm, no murmurs  GI: nondistended, nontender, bowel sounds present  Lymph/Hematologic: no lymphadenopathy  Skin: dry, no rash  Musculoskeletal: good muscle tone, strength 5/5 in upper and lower extremities  Neurologic: no focal deficits  Neuropsychiatric: appropriate affact    DATA:  Lab:   Recent Labs   Lab Test 01/06/18  0741 08/31/17  0841   CHOL 152 185   HDL 63 59   LDL 75 108*   TRIG 71 91     ASSESSMENT:  67-year-old male seen for CAD.  Overall he has no concerning cardiac symptoms.  He may be having some statin myalgias.  Once he is off prednisone, he will go off atorvastatin for 3 to 4 weeks.  If symptoms resolve, he could try rosuvastatin alternatively, or possibly pravastatin.  He is due to have lipids checked once he gets back on a regular statin.    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    RECOMMENDATIONS:  1.  CAD  -Continue medical therapy    2.  Dyslipidemia  -Hold atorvastatin to see if myalgias improve, he will call with update on symptoms, could alternatively try rosuvastatin or pravastatin    He will call if he needs a treadmill stress echo for DOT purposes.  Otherwise follow-up in 1 year.  Repeat echo next year to follow-up on aortic root dilation.    Eliceo Valera MD  Cardiology - [facility]  Heart  Pager:  [phone]  Text Page  October 19, 2022

## 2022-10-19 ENCOUNTER — OFFICE VISIT (OUTPATIENT)
Dept: CARDIOLOGY | Facility: CLINIC | Age: 67
End: 2022-10-19
Payer: COMMERCIAL

## 2022-10-19 VITALS
SYSTOLIC BLOOD PRESSURE: 131 MMHG | HEART RATE: 54 BPM | RESPIRATION RATE: 12 BRPM | BODY MASS INDEX: 27.39 KG/M2 | OXYGEN SATURATION: 99 % | WEIGHT: 196.4 LBS | DIASTOLIC BLOOD PRESSURE: 83 MMHG

## 2022-10-19 DIAGNOSIS — E78.5 HYPERLIPIDEMIA LDL GOAL <70: ICD-10-CM

## 2022-10-19 DIAGNOSIS — I25.10 CORONARY ARTERY DISEASE INVOLVING NATIVE CORONARY ARTERY OF NATIVE HEART WITHOUT ANGINA PECTORIS: Primary | ICD-10-CM

## 2022-10-19 DIAGNOSIS — I24.9 ACS (ACUTE CORONARY SYNDROME) (H): ICD-10-CM

## 2022-10-19 PROCEDURE — 99214 OFFICE O/P EST MOD 30 MIN: CPT | Performed by: INTERNAL MEDICINE

## 2022-10-19 RX ORDER — METOPROLOL TARTRATE 25 MG/1
25 TABLET, FILM COATED ORAL 2 TIMES DAILY
Qty: 180 TABLET | Refills: 0 | Status: SHIPPED | OUTPATIENT
Start: 2022-10-19 | End: 2023-04-21

## 2022-10-19 RX ORDER — NITROGLYCERIN 0.4 MG/1
0.4 TABLET SUBLINGUAL EVERY 5 MIN PRN
Qty: 25 TABLET | Refills: 1 | Status: SHIPPED | OUTPATIENT
Start: 2022-10-19

## 2022-10-19 RX ORDER — ATORVASTATIN CALCIUM 40 MG/1
40 TABLET, FILM COATED ORAL AT BEDTIME
Qty: 90 TABLET | Refills: 0 | Status: SHIPPED | OUTPATIENT
Start: 2022-10-19 | End: 2023-05-05

## 2022-10-19 RX ORDER — PREDNISONE 10 MG/1
10 TABLET ORAL DAILY
COMMUNITY
Start: 2022-10-04 | End: 2024-02-13

## 2022-10-19 NOTE — LETTER
10/19/2022    Eliceo Cast DO  [facility] [address] [address]    RE: Hai Redding       Dear Colleague,     I had the pleasure of seeing Hai Redding in the [facility].  CARDIOLOGY VISIT    REASON FOR VISIT: CAD    SUBJECTIVE:  66 y/o male seen for f/u of CAD and aortic root dilation.     In June 2017 he presented with NSTEMI, troponin 0.5. Coronary angiogram showed LAD with 80% stenosis with visible thrombus after a small D1, minimal disease of the circumflex and dominant RCA, he underwent single drug-eluting stent to the mid LAD.        Echo June 2017 showed EF 60%, normal wall motion, normal right ventricle, no valve disease, sinus of Valsalva 4.5 cm, ascending aorta 3.7 cm.         Treadmill stress 10/2018 showed 13:00, 15 METS, 101% max HR, no chest pain, negative EKG.    Treadmill stress echo December 2020 showed no evidence of ischemia.    Overall he been doing quite well.  He travels a lot and plays competitive softball.  He has no exertional chest pain.  His main issue has been some diffuse muscle and joint aches.  His primary physician put him on a short course of prednisone, he felt markedly better, but symptoms returned when he stopped the prednisone.    MEDICATIONS:  Current Outpatient Medications   Medication     aspirin 81 MG chewable tablet     atorvastatin (LIPITOR) 40 MG tablet     metoprolol tartrate (LOPRESSOR) 25 MG tablet     nitroGLYcerin (NITROSTAT) 0.4 MG sublingual tablet     No current facility-administered medications for this visit.       ALLERGIES:  Allergies   Allergen Reactions     Shellfish Allergy        REVIEW OF SYSTEMS:  Constitutional:  No weight loss, fever, chills  HEENT:  Eyes:  No visual loss, blurred vision, double vision or yellow sclerae. No hearing loss, sneezing, congestion, runny nose or sore throat.  Skin:  No rash or itching.  Cardiovascular: per HPI  Respiratory: per HPI  GI:  No anorexia, nausea,  vomiting or diarrhea. No abdominal pain or blood.  :  No dysurea, hematuria  Neurologic:  No headache, paralysis, ataxia, numbness or tingling in the extremities. No change in bowel or bladder control.  Musculoskeletal:  No muscle pain  Hematologic:  No bleeding or bruising.  Lymphatics:  No enlarged nodes. No history of splenectomy.  Endocrine:  No reports of sweating, cold or heat intolerance. No polyuria or polydipsia.  Allergies:  No history of asthma, hives, eczema or rhinitis.    PHYSICAL EXAM:  /83 (BP Location: Right arm, Patient Position: Sitting, Cuff Size: Adult Regular)   Pulse 54   Resp 12   Wt 89.1 kg (196 lb 6.4 oz)   SpO2 99%   BMI 27.39 kg/m      Constitutional: awake, alert, no distress  Eyes: PERRL, sclera nonicteric  ENT: trachea midline  Respiratory: Lungs clear  Cardiovascular: Regular rate and rhythm, no murmurs  GI: nondistended, nontender, bowel sounds present  Lymph/Hematologic: no lymphadenopathy  Skin: dry, no rash  Musculoskeletal: good muscle tone, strength 5/5 in upper and lower extremities  Neurologic: no focal deficits  Neuropsychiatric: appropriate affact    DATA:  Lab:   Recent Labs   Lab Test 01/06/18  0741 08/31/17  0841   CHOL 152 185   HDL 63 59   LDL 75 108*   TRIG 71 91     ASSESSMENT:  67-year-old male seen for CAD.  Overall he has no concerning cardiac symptoms.  He may be having some statin myalgias.  Once he is off prednisone, he will go off atorvastatin for 3 to 4 weeks.  If symptoms resolve, he could try rosuvastatin alternatively, or possibly pravastatin.  He is due to have lipids checked once he gets back on a regular statin.    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    RECOMMENDATIONS:  1.  CAD  -Continue medical therapy    2.  Dyslipidemia  -Hold atorvastatin to see if myalgias improve, he will call with update on symptoms, could alternatively try  rosuvastatin or pravastatin    He will call if he needs a treadmill stress echo for DOT purposes.  Otherwise follow-up in 1 year.  Repeat echo next year to follow-up on aortic root dilation.    Eliceo Valera MD  Cardiology - [facility] Heart  Pager:  [phone]  Text Page  October 19, 2022    Thank you for allowing me to participate in the care of your patient.      Sincerely,     Eliceo Valera MD     [facility] Heart Care  cc:   Eliceo Valera MD  [facility] HEART CARE  [address] [place] [address]

## 2022-10-19 NOTE — PATIENT INSTRUCTIONS
Try stopping atorvastatin to see if the aches go away.  You can go off it for 3-4 weeks.  Call our clinic with update on symptoms based on how you feel.    Check fasting cholesterol in 1-2 months, order placed for any [facility] lab.    Call if you need a stress test for DOT approval.

## 2022-12-20 ENCOUNTER — LAB (OUTPATIENT)
Dept: LAB | Facility: CLINIC | Age: 67
End: 2022-12-20
Payer: COMMERCIAL

## 2022-12-20 DIAGNOSIS — E78.5 HYPERLIPIDEMIA LDL GOAL <70: ICD-10-CM

## 2022-12-20 LAB
CHOLEST SERPL-MCNC: 192 MG/DL
HDLC SERPL-MCNC: 79 MG/DL
LDLC SERPL CALC-MCNC: 95 MG/DL
NONHDLC SERPL-MCNC: 113 MG/DL
TRIGL SERPL-MCNC: 91 MG/DL

## 2022-12-20 PROCEDURE — 80061 LIPID PANEL: CPT | Performed by: INTERNAL MEDICINE

## 2022-12-20 PROCEDURE — 36415 COLL VENOUS BLD VENIPUNCTURE: CPT | Performed by: INTERNAL MEDICINE

## 2023-01-07 ENCOUNTER — HEALTH MAINTENANCE LETTER (OUTPATIENT)
Age: 68
End: 2023-01-07

## 2023-04-21 DIAGNOSIS — I25.10 CORONARY ARTERY DISEASE INVOLVING NATIVE CORONARY ARTERY OF NATIVE HEART WITHOUT ANGINA PECTORIS: ICD-10-CM

## 2023-04-21 RX ORDER — METOPROLOL TARTRATE 25 MG/1
25 TABLET, FILM COATED ORAL 2 TIMES DAILY
Qty: 180 TABLET | Refills: 1 | Status: SHIPPED | OUTPATIENT
Start: 2023-04-21 | End: 2023-10-13

## 2023-04-21 NOTE — CONFIDENTIAL NOTE
[facility] Cardiology Refill Guideline reviewed.  Medication meets criteria for refill.     David Garvin RN

## 2023-05-01 DIAGNOSIS — I24.9 ACS (ACUTE CORONARY SYNDROME) (H): ICD-10-CM

## 2023-05-01 RX ORDER — ASPIRIN 81 MG/1
81 TABLET, CHEWABLE ORAL DAILY
Qty: 90 TABLET | Refills: 2 | Status: SHIPPED | OUTPATIENT
Start: 2023-05-01 | End: 2024-01-25

## 2023-05-01 NOTE — TELEPHONE ENCOUNTER
[facility] Cardiology Refill Guideline reviewed.  Medication meets criteria for refill. Refills sent. Mary Vance RN Cardiology May 1, 2023, 9:45 AM

## 2023-05-01 NOTE — TELEPHONE ENCOUNTER
Last Office Visit: 10/19/22  Next Office Visit: TBD  Last Fill Date: 2/2/23  Silke Lauren LPN Cardiology   5/1/2023 8:22 AM

## 2023-05-05 ENCOUNTER — MYC MEDICAL ADVICE (OUTPATIENT)
Dept: CARDIOLOGY | Facility: CLINIC | Age: 68
End: 2023-05-05
Payer: COMMERCIAL

## 2023-05-05 ENCOUNTER — MYC REFILL (OUTPATIENT)
Dept: CARDIOLOGY | Facility: CLINIC | Age: 68
End: 2023-05-05
Payer: COMMERCIAL

## 2023-05-05 DIAGNOSIS — E78.5 HYPERLIPIDEMIA LDL GOAL <70: ICD-10-CM

## 2023-05-08 RX ORDER — ATORVASTATIN CALCIUM 40 MG/1
40 TABLET, FILM COATED ORAL AT BEDTIME
Qty: 90 TABLET | Refills: 1 | Status: SHIPPED | OUTPATIENT
Start: 2023-05-08 | End: 2023-10-16

## 2023-05-08 NOTE — TELEPHONE ENCOUNTER
[facility] Cardiology Refill Guideline reviewed.  Medication meets criteria for refill. Refills sent. Mary Vance RN Cardiology May 8, 2023, 8:07 AM

## 2023-06-02 ENCOUNTER — HEALTH MAINTENANCE LETTER (OUTPATIENT)
Age: 68
End: 2023-06-02

## 2023-10-13 DIAGNOSIS — I25.10 CORONARY ARTERY DISEASE INVOLVING NATIVE CORONARY ARTERY OF NATIVE HEART WITHOUT ANGINA PECTORIS: ICD-10-CM

## 2023-10-13 RX ORDER — METOPROLOL TARTRATE 25 MG/1
25 TABLET, FILM COATED ORAL 2 TIMES DAILY
Qty: 180 TABLET | Refills: 0 | Status: SHIPPED | OUTPATIENT
Start: 2023-10-13 | End: 2024-01-19

## 2023-10-13 NOTE — TELEPHONE ENCOUNTER
Last Office Visit: 10/19/22 Dr. Valera  Next Office Visit: TBD  Last Fill Date: 4/21/23      Patient is due for a visit 10/19/23.     Salud Galvez CMA 10/13/2023 9:18 AM

## 2023-10-13 NOTE — TELEPHONE ENCOUNTER
[facility] Cardiology Refill Guideline reviewed.  Medication meets criteria for refill. Due for follow up. 90 day fill given and pt asked to call for an appointment. Mary Vance RN Cardiology October 13, 2023, 3:41 PM

## 2024-01-15 ENCOUNTER — OFFICE VISIT (OUTPATIENT)
Dept: FAMILY MEDICINE | Facility: CLINIC | Age: 69
End: 2024-01-15
Payer: COMMERCIAL

## 2024-01-15 VITALS
HEIGHT: 71 IN | WEIGHT: 199 LBS | TEMPERATURE: 97.1 F | DIASTOLIC BLOOD PRESSURE: 85 MMHG | HEART RATE: 52 BPM | OXYGEN SATURATION: 99 % | BODY MASS INDEX: 27.86 KG/M2 | SYSTOLIC BLOOD PRESSURE: 135 MMHG | RESPIRATION RATE: 16 BRPM

## 2024-01-15 DIAGNOSIS — I10 HYPERTENSION GOAL BP (BLOOD PRESSURE) < 130/80: ICD-10-CM

## 2024-01-15 DIAGNOSIS — R43.0 ANOSMIA: ICD-10-CM

## 2024-01-15 DIAGNOSIS — I71.21 ANEURYSM OF ASCENDING AORTA WITHOUT RUPTURE (H): ICD-10-CM

## 2024-01-15 DIAGNOSIS — H61.21 IMPACTED CERUMEN OF RIGHT EAR: Primary | ICD-10-CM

## 2024-01-15 DIAGNOSIS — I25.10 CORONARY ARTERY DISEASE INVOLVING NATIVE CORONARY ARTERY OF NATIVE HEART WITHOUT ANGINA PECTORIS: ICD-10-CM

## 2024-01-15 DIAGNOSIS — H91.91 ACUTE HEARING LOSS OF RIGHT EAR: ICD-10-CM

## 2024-01-15 DIAGNOSIS — Z95.5 PRESENCE OF DRUG COATED STENT IN LAD CORONARY ARTERY: ICD-10-CM

## 2024-01-15 PROBLEM — K57.30 DIVERTICULAR DISEASE OF LARGE INTESTINE: Status: ACTIVE | Noted: 2022-05-06

## 2024-01-15 PROBLEM — H11.001 PTERYGIUM OF EYE, RIGHT: Chronic | Status: ACTIVE | Noted: 2022-05-18

## 2024-01-15 PROBLEM — I21.4 NSTEMI (NON-ST ELEVATION MYOCARDIAL INFARCTION) (H): Status: ACTIVE | Noted: 2017-06-13

## 2024-01-15 PROBLEM — I21.4 NSTEMI (NON-ST ELEVATION MYOCARDIAL INFARCTION) (H): Status: RESOLVED | Noted: 2017-06-13 | Resolved: 2024-01-15

## 2024-01-15 PROBLEM — R19.8 DIGESTIVE SYMPTOM: Status: ACTIVE | Noted: 2022-05-06

## 2024-01-15 PROBLEM — D12.3 BENIGN NEOPLASM OF TRANSVERSE COLON: Status: ACTIVE | Noted: 2022-05-10

## 2024-01-15 PROBLEM — D12.2 BENIGN NEOPLASM OF ASCENDING COLON: Status: ACTIVE | Noted: 2022-05-10

## 2024-01-15 PROBLEM — H35.3131 EARLY DRY STAGE NONEXUDATIVE AGE-RELATED MACULAR DEGENERATION OF BOTH EYES: Chronic | Status: ACTIVE | Noted: 2022-05-18

## 2024-01-15 PROCEDURE — 69209 REMOVE IMPACTED EAR WAX UNI: CPT | Mod: RT | Performed by: FAMILY MEDICINE

## 2024-01-15 PROCEDURE — 99203 OFFICE O/P NEW LOW 30 MIN: CPT | Mod: 25 | Performed by: FAMILY MEDICINE

## 2024-01-15 RX ORDER — MOMETASONE FUROATE MONOHYDRATE 50 UG/1
2 SPRAY, METERED NASAL DAILY
Qty: 17 G | Refills: 1 | Status: SHIPPED | OUTPATIENT
Start: 2024-01-15 | End: 2024-01-18

## 2024-01-15 ASSESSMENT — PAIN SCALES - GENERAL: PAINLEVEL: NO PAIN (0)

## 2024-01-15 NOTE — PROGRESS NOTES
"Office Visit  [facility] - Family Medicine  Date of Service: Perry 15, 2024      Subjective   Hai Redding is a 68 year old male who presents for   Chief Complaint   Patient presents with    Ear Problem     RIGHT     Right ear plugged  Onset: Last week 1/8/24  Never had this before  No hearing problems at baseline  No URI symptoms  Lost smell/taste in early 2019 - covid  Used to it now  Not smelling/tasting anything - using hot sauce  No nasal congestion - has tried all the medicines.   Blood pressure is fine at home.     Seeing cardiologist in February, will get labs before then.     Objective   /85 (BP Location: Left arm)   Pulse 52   Temp 97.1  F (36.2  C) (Temporal)   Resp 16   Ht 1.8 m (5' 10.87\")   Wt 90.3 kg (199 lb)   SpO2 99%   BMI 27.86 kg/m   He reports that he quit smoking about 19 years ago. His smoking use included cigarettes. He has never used smokeless tobacco.  BP Readings from Last 6 Encounters:   01/15/24 135/85   10/19/22 131/83   10/29/19 129/68   11/01/18 125/78   07/31/18 128/83   08/22/17 119/76       Gen: Alert, no apparent distress.  Ears: Right canal densely obstructed with cerumen. After ear irrigation, it is clear and Tms are normal  Eyes: no conjunctivitis.   Sinuses: non-tender.  Nose: Pale boggy mucosa  Mouth: adequate dentition.   Tonsils/oropharynx: no tonsillar hypertrophy, normal oropharynx.   Neck: no significant lymphadenopathy, thyroid not enlarged, not tender.      No results found for any visits on 01/15/24.  Assessment & Plan     Cerumen impaction, right. Irrigation performed and resolved problems.  Anosmia. Some inflammation in nose today. Recommend 8 week trial of Nasonex.  Coronary artery disease and ascending aortic aneurysm.   Risk factors not at goal:  LDL goal <70 - currently: 95 (12/20/22).  BP goal <130/80 - currently: 135/85. But reported normal BP at home.  Cardiology appointment and follow up labs pending.      Order Summary     "                                                  Impacted cerumen of right ear    Acute hearing loss of right ear    Hypertension goal BP (blood pressure) < 130/80    Aneurysm of ascending aorta without rupture (H24)    Coronary artery disease    Presence of drug coated stent in LAD coronary artery    Anosmia  -     mometasone (NASONEX) 50 MCG/ACT nasal spray; Spray 2 sprays into both nostrils daily            Future Appointments   Date Time Provider Department Center   1/15/2024 10:00 AM [facility]   2/13/2024 11:00 AM Eliceo Valera MD [facility]       Completed by: Delma Brunson M.D., [facility]. 1/15/2024 9:44 AM.  This transcription uses voice recognition software, which may contain typographical errors.  MDM: [place] neighborhood: [place] [id number], language: English,     Prior to immunization administration, verified patients identity using patient s name and date of birth. Please see Immunization Activity for additional information.     Screening Questionnaire for Adult Immunization    Are you sick today?   No   Do you have allergies to medications, food, a vaccine component or latex?   No   Have you ever had a serious reaction after receiving a vaccination?   No   Do you have a long-term health problem with heart, lung, kidney, or metabolic disease (e.g., diabetes), asthma, a blood disorder, no spleen, complement component deficiency, a cochlear implant, or a spinal fluid leak?  Are you on long-term aspirin therapy?   No   Do you have cancer, leukemia, HIV/AIDS, or any other immune system problem?   No   Do you have a parent, brother, or sister with an immune system problem?   No   In the past 3 months, have you taken medications that affect  your immune system, such as prednisone, other steroids, or anticancer drugs; drugs for the treatment of rheumatoid arthritis, Crohn s disease, or psoriasis; or have you had radiation treatments?   No    Have you had a seizure, or a brain or other nervous system problem?   No   During the past year, have you received a transfusion of blood or blood    products, or been given immune (gamma) globulin or antiviral drug?   No   For women: Are you pregnant or is there a chance you could become       pregnant during the next month?   No   Have you received any vaccinations in the past 4 weeks?   No     Immunization questionnaire answers were all negative.          Screening performed by Marti Quintero MA on 1/15/2024 at 9:42 AM.

## 2024-01-16 ENCOUNTER — TELEPHONE (OUTPATIENT)
Dept: FAMILY MEDICINE | Facility: CLINIC | Age: 69
End: 2024-01-16
Payer: COMMERCIAL

## 2024-01-16 DIAGNOSIS — R43.0 ANOSMIA: ICD-10-CM

## 2024-01-18 RX ORDER — MOMETASONE FUROATE MONOHYDRATE 50 UG/1
2 SPRAY, METERED NASAL DAILY
Qty: 17 G | Refills: 1 | Status: SHIPPED | OUTPATIENT
Start: 2024-01-18

## 2024-01-18 NOTE — TELEPHONE ENCOUNTER
Patient calling back asking about the status of this request. He really needs this medication. Patient stated that his insurance approves this medication and is wanting to know what the hold up is.

## 2024-01-19 DIAGNOSIS — I25.10 CORONARY ARTERY DISEASE INVOLVING NATIVE CORONARY ARTERY OF NATIVE HEART WITHOUT ANGINA PECTORIS: ICD-10-CM

## 2024-01-19 RX ORDER — METOPROLOL TARTRATE 25 MG/1
25 TABLET, FILM COATED ORAL 2 TIMES DAILY
Qty: 180 TABLET | Refills: 0 | Status: SHIPPED | OUTPATIENT
Start: 2024-01-19 | End: 2024-02-13

## 2024-01-19 NOTE — TELEPHONE ENCOUNTER
Last Office Visit: 10/19/22 Dr. Valera  Next Office Visit: 2/13/24  Last Fill Date: 10/13/23    Salud Galvez CMA 1/19/2024 1:43 PM

## 2024-01-19 NOTE — TELEPHONE ENCOUNTER
[facility] Cardiology Refill Guideline reviewed.  Medication meets criteria for refill.    Lakeshia Schmitz, RN

## 2024-01-25 DIAGNOSIS — E78.5 HYPERLIPIDEMIA LDL GOAL <70: ICD-10-CM

## 2024-01-25 DIAGNOSIS — I24.9 ACS (ACUTE CORONARY SYNDROME) (H): ICD-10-CM

## 2024-01-25 RX ORDER — ATORVASTATIN CALCIUM 40 MG/1
40 TABLET, FILM COATED ORAL AT BEDTIME
Qty: 90 TABLET | Refills: 0 | Status: SHIPPED | OUTPATIENT
Start: 2024-01-25 | End: 2024-02-13

## 2024-01-25 RX ORDER — ASPIRIN 81 MG/1
81 TABLET, CHEWABLE ORAL DAILY
Qty: 90 TABLET | Refills: 2 | Status: SHIPPED | OUTPATIENT
Start: 2024-01-25

## 2024-01-25 NOTE — TELEPHONE ENCOUNTER
Last Office Visit: 10/19/22 Dr. Valera  Next Office Visit: 02/13/24 Dr. Valera  Last Fill Date: 10/27/23  Gloria Turner MA Cardiology   1/25/2024 7:48 AM

## 2024-01-25 NOTE — TELEPHONE ENCOUNTER
[facility] Cardiology Refill Guideline reviewed.  Medication meets criteria for refill.    Pt given 90 day supply with no refills. Pt needs to be seen by cardiology before more refills are given and have lipids checked.    Lakeshia Schmitz RN

## 2024-01-31 ENCOUNTER — HOSPITAL ENCOUNTER (OUTPATIENT)
Dept: CARDIOLOGY | Facility: CLINIC | Age: 69
Discharge: HOME | End: 2024-01-31
Attending: INTERNAL MEDICINE | Admitting: INTERNAL MEDICINE
Payer: COMMERCIAL

## 2024-01-31 ENCOUNTER — LAB (OUTPATIENT)
Dept: LAB | Facility: CLINIC | Age: 69
End: 2024-01-31
Payer: COMMERCIAL

## 2024-01-31 DIAGNOSIS — I25.10 CORONARY ARTERY DISEASE INVOLVING NATIVE CORONARY ARTERY OF NATIVE HEART WITHOUT ANGINA PECTORIS: ICD-10-CM

## 2024-01-31 DIAGNOSIS — E78.5 HYPERLIPIDEMIA LDL GOAL <70: ICD-10-CM

## 2024-01-31 LAB
CHOLEST SERPL-MCNC: 167 MG/DL
FASTING STATUS PATIENT QL REPORTED: YES
HDLC SERPL-MCNC: 67 MG/DL
LDLC SERPL CALC-MCNC: 85 MG/DL
LVEF ECHO: NORMAL
NONHDLC SERPL-MCNC: 100 MG/DL
TRIGL SERPL-MCNC: 73 MG/DL

## 2024-01-31 PROCEDURE — 93306 TTE W/DOPPLER COMPLETE: CPT | Mod: 26 | Performed by: INTERNAL MEDICINE

## 2024-01-31 PROCEDURE — 36415 COLL VENOUS BLD VENIPUNCTURE: CPT | Performed by: INTERNAL MEDICINE

## 2024-01-31 PROCEDURE — 93306 TTE W/DOPPLER COMPLETE: CPT

## 2024-01-31 PROCEDURE — 80061 LIPID PANEL: CPT | Performed by: INTERNAL MEDICINE

## 2024-02-09 NOTE — PROGRESS NOTES
CARDIOLOGY VISIT    REASON FOR VISIT: CAD, aortic root dilation    SUBJECTIVE:  66 y/o male seen for f/u of CAD and aortic root dilation.     In June 2017 he presented with NSTEMI, troponin 0.5. Coronary angiogram showed LAD with 80% stenosis with visible thrombus after a small D1, minimal disease of the circumflex and dominant RCA, he underwent single drug-eluting stent to the mid LAD.        Echo June 2017 showed EF 60%, normal wall motion, normal right ventricle, no valve disease, sinus of Valsalva 4.5 cm, ascending aorta 3.7 cm.         Treadmill stress 10/2018 showed 13:00, 15 METS, 101% max HR, no chest pain, negative EKG.     Treadmill stress echo December 2020 showed no evidence of ischemia, aortic root 5.0 cm, ascending aorta 4.2 cm.    Echo January 2024 showed EF 60%, aortic root 4.7cm, ascending aorta 4.3 cm.    He has been doing well recently.  He continues to play softball quite a bit.  He denies any exertional shortness of breath or chest pain.  He believes blood pressure runs well at home, but cannot recall the numbers.    MEDICATIONS:  Current Outpatient Medications   Medication    aspirin (ASA) 81 MG chewable tablet    atorvastatin (LIPITOR) 40 MG tablet    metoprolol tartrate (LOPRESSOR) 25 MG tablet    mometasone (NASONEX) 50 MCG/ACT nasal spray    nitroGLYcerin (NITROSTAT) 0.4 MG sublingual tablet    predniSONE (DELTASONE) 10 MG tablet     No current facility-administered medications for this visit.       ALLERGIES:  Allergies   Allergen Reactions    Shellfish Allergy        REVIEW OF SYSTEMS:  Constitutional:  No weight loss, fever, chills  HEENT:  Eyes:  No visual loss, blurred vision, double vision or yellow sclerae. No hearing loss, sneezing, congestion, runny nose or sore throat.  Skin:  No rash or itching.  Cardiovascular: per HPI  Respiratory: per HPI  GI:  No anorexia, nausea, vomiting or diarrhea. No abdominal pain or blood.  :  No dysurea, hematuria  Neurologic:  No headache,  paralysis, ataxia, numbness or tingling in the extremities. No change in bowel or bladder control.  Musculoskeletal:  No muscle pain  Hematologic:  No bleeding or bruising.  Lymphatics:  No enlarged nodes. No history of splenectomy.  Endocrine:  No reports of sweating, cold or heat intolerance. No polyuria or polydipsia.  Allergies:  No history of asthma, hives, eczema or rhinitis.    PHYSICAL EXAM:  /74 (BP Location: Right arm, Patient Position: Sitting)   Pulse 54   Resp 12   Wt 88 kg (194 lb)   SpO2 100%   BMI 27.16 kg/m    Constitutional: awake, alert, no distress  Eyes: PERRL, sclera nonicteric  ENT: trachea midline  Respiratory: Lungs clear  Cardiovascular: Regular rate and rhythm, no murmurs  GI: nondistended, nontender, bowel sounds present  Lymph/Hematologic: no lymphadenopathy  Skin: dry, no rash  Musculoskeletal: good muscle tone, strength 5/5 in upper and lower extremities  Neurologic: no focal deficits  Neuropsychiatric: appropriate affact    DATA:  Lab:   Recent Labs   Lab Test 01/31/24  0713 12/20/22  1437   CHOL 167 192   HDL 67 79   LDL 85 95   TRIG 73 91     ASSESSMENT:  68-year-old male seen for CAD and dilated aortic root.  He has no concerning symptoms.  Aortic root is stable.  Blood pressure seems well-controlled.  Medications will be kept the same.    RECOMMENDATIONS:  1.  CAD  -Continue current medications    2.  Dilated aortic root  -Annual echoes  -Goal blood pressure 120s or less    Follow-up in 1 year after echo.    Eliceo Valera MD  Cardiology - [facility] Heart  Pager:  [phone]  Text Page  February 13, 2024

## 2024-02-13 ENCOUNTER — OFFICE VISIT (OUTPATIENT)
Dept: CARDIOLOGY | Facility: CLINIC | Age: 69
End: 2024-02-13
Payer: COMMERCIAL

## 2024-02-13 VITALS
DIASTOLIC BLOOD PRESSURE: 74 MMHG | OXYGEN SATURATION: 100 % | BODY MASS INDEX: 27.16 KG/M2 | WEIGHT: 194 LBS | RESPIRATION RATE: 12 BRPM | HEART RATE: 54 BPM | SYSTOLIC BLOOD PRESSURE: 114 MMHG

## 2024-02-13 DIAGNOSIS — E78.5 HYPERLIPIDEMIA LDL GOAL <70: ICD-10-CM

## 2024-02-13 DIAGNOSIS — I25.10 CORONARY ARTERY DISEASE INVOLVING NATIVE CORONARY ARTERY OF NATIVE HEART WITHOUT ANGINA PECTORIS: ICD-10-CM

## 2024-02-13 PROCEDURE — 99214 OFFICE O/P EST MOD 30 MIN: CPT | Performed by: INTERNAL MEDICINE

## 2024-02-13 RX ORDER — ATORVASTATIN CALCIUM 40 MG/1
40 TABLET, FILM COATED ORAL AT BEDTIME
Qty: 90 TABLET | Refills: 0 | Status: SHIPPED | OUTPATIENT
Start: 2024-02-13 | End: 2024-05-13

## 2024-02-13 RX ORDER — METOPROLOL TARTRATE 25 MG/1
25 TABLET, FILM COATED ORAL 2 TIMES DAILY
Qty: 180 TABLET | Refills: 0 | Status: SHIPPED | OUTPATIENT
Start: 2024-02-13 | End: 2024-07-29

## 2024-02-13 NOTE — PATIENT INSTRUCTIONS
Your recent echo was stable.  Your heart function is normal.  The dilation of the aorta did not change compared to your last echo.    We would like to see you back in about 1 years time with another echocardiogram.

## 2024-02-13 NOTE — LETTER
2024    Eliceo Cast DO  Xxx Resigned [place] License  2021    RE: Hai Redding       Dear Colleague,     I had the pleasure of seeing Hai BERGER Miladis in the [facility].  CARDIOLOGY VISIT    REASON FOR VISIT: CAD, aortic root dilation    SUBJECTIVE:  68 y/o male seen for f/u of CAD and aortic root dilation.     In 2017 he presented with NSTEMI, troponin 0.5. Coronary angiogram showed LAD with 80% stenosis with visible thrombus after a small D1, minimal disease of the circumflex and dominant RCA, he underwent single drug-eluting stent to the mid LAD.        Echo 2017 showed EF 60%, normal wall motion, normal right ventricle, no valve disease, sinus of Valsalva 4.5 cm, ascending aorta 3.7 cm.         Treadmill stress 10/2018 showed 13:00, 15 METS, 101% max HR, no chest pain, negative EKG.     Treadmill stress echo 2020 showed no evidence of ischemia, aortic root 5.0 cm, ascending aorta 4.2 cm.    Echo 2024 showed EF 60%, aortic root 4.7cm, ascending aorta 4.3 cm.    He has been doing well recently.  He continues to play softball quite a bit.  He denies any exertional shortness of breath or chest pain.  He believes blood pressure runs well at home, but cannot recall the numbers.    MEDICATIONS:  Current Outpatient Medications   Medication    aspirin (ASA) 81 MG chewable tablet    atorvastatin (LIPITOR) 40 MG tablet    metoprolol tartrate (LOPRESSOR) 25 MG tablet    mometasone (NASONEX) 50 MCG/ACT nasal spray    nitroGLYcerin (NITROSTAT) 0.4 MG sublingual tablet    predniSONE (DELTASONE) 10 MG tablet     No current facility-administered medications for this visit.       ALLERGIES:  Allergies   Allergen Reactions    Shellfish Allergy        REVIEW OF SYSTEMS:  Constitutional:  No weight loss, fever, chills  HEENT:  Eyes:  No visual loss, blurred vision, double vision or yellow sclerae. No hearing loss, sneezing, congestion, runny nose or sore  throat.  Skin:  No rash or itching.  Cardiovascular: per HPI  Respiratory: per HPI  GI:  No anorexia, nausea, vomiting or diarrhea. No abdominal pain or blood.  :  No dysurea, hematuria  Neurologic:  No headache, paralysis, ataxia, numbness or tingling in the extremities. No change in bowel or bladder control.  Musculoskeletal:  No muscle pain  Hematologic:  No bleeding or bruising.  Lymphatics:  No enlarged nodes. No history of splenectomy.  Endocrine:  No reports of sweating, cold or heat intolerance. No polyuria or polydipsia.  Allergies:  No history of asthma, hives, eczema or rhinitis.    PHYSICAL EXAM:  /74 (BP Location: Right arm, Patient Position: Sitting)   Pulse 54   Resp 12   Wt 88 kg (194 lb)   SpO2 100%   BMI 27.16 kg/m    Constitutional: awake, alert, no distress  Eyes: PERRL, sclera nonicteric  ENT: trachea midline  Respiratory: Lungs clear  Cardiovascular: Regular rate and rhythm, no murmurs  GI: nondistended, nontender, bowel sounds present  Lymph/Hematologic: no lymphadenopathy  Skin: dry, no rash  Musculoskeletal: good muscle tone, strength 5/5 in upper and lower extremities  Neurologic: no focal deficits  Neuropsychiatric: appropriate affact    DATA:  Lab:   Recent Labs   Lab Test 01/31/24  0713 12/20/22  1437   CHOL 167 192   HDL 67 79   LDL 85 95   TRIG 73 91     ASSESSMENT:  68-year-old male seen for CAD and dilated aortic root.  He has no concerning symptoms.  Aortic root is stable.  Blood pressure seems well-controlled.  Medications will be kept the same.    RECOMMENDATIONS:  1.  CAD  -Continue current medications    2.  Dilated aortic root  -Annual echoes  -Goal blood pressure 120s or less    Follow-up in 1 year after echo.    Eliceo Valera MD  Cardiology - [facility]  Pager:  [phone]  Text Page  February 13, 2024        Thank you for allowing me to participate in the care of your patient.      Sincerely,     Eliceo Valera MD     [facility]  [facility]  cc:   Eliceo Valera MD

## 2024-05-13 DIAGNOSIS — E78.5 HYPERLIPIDEMIA LDL GOAL <70: ICD-10-CM

## 2024-05-13 RX ORDER — ATORVASTATIN CALCIUM 40 MG/1
40 TABLET, FILM COATED ORAL AT BEDTIME
Qty: 90 TABLET | Refills: 3 | Status: SHIPPED | OUTPATIENT
Start: 2024-05-13

## 2024-05-13 NOTE — TELEPHONE ENCOUNTER
[facility] Cardiology Refill Guideline reviewed.  Medication meets criteria for refill. Mary Vance RN Cardiology May 13, 2024, 8:14 AM

## 2024-05-13 NOTE — TELEPHONE ENCOUNTER
Last Office Visit: 2/13/24 ([place])  Next Office Visit: TBD  Last Fill Date: 2/13/24  Silke Lauren LPN Cardiology   5/13/2024 8:12 AM

## 2024-06-29 ENCOUNTER — HEALTH MAINTENANCE LETTER (OUTPATIENT)
Age: 69
End: 2024-06-29

## 2024-07-29 ENCOUNTER — MYC REFILL (OUTPATIENT)
Dept: CARDIOLOGY | Facility: CLINIC | Age: 69
End: 2024-07-29
Payer: COMMERCIAL

## 2024-07-29 DIAGNOSIS — I25.10 CORONARY ARTERY DISEASE INVOLVING NATIVE CORONARY ARTERY OF NATIVE HEART WITHOUT ANGINA PECTORIS: ICD-10-CM

## 2024-07-30 RX ORDER — METOPROLOL TARTRATE 25 MG/1
25 TABLET, FILM COATED ORAL 2 TIMES DAILY
Qty: 180 TABLET | Refills: 3 | Status: SHIPPED | OUTPATIENT
Start: 2024-07-30

## 2024-07-30 NOTE — TELEPHONE ENCOUNTER
[facility] Cardiology Refill Guideline reviewed.  Medication meets criteria for refill.    Anni Silvestre RN

## 2025-07-12 ENCOUNTER — HEALTH MAINTENANCE LETTER (OUTPATIENT)
Age: 70
End: 2025-07-12

## (undated) RX ORDER — ASPIRIN 81 MG/1
TABLET, CHEWABLE ORAL
Status: DISPENSED
Start: 2017-06-10

## (undated) RX ORDER — FENTANYL CITRATE 50 UG/ML
INJECTION, SOLUTION INTRAMUSCULAR; INTRAVENOUS
Status: DISPENSED
Start: 2017-06-10

## (undated) RX ORDER — HEPARIN SODIUM 1000 [USP'U]/ML
INJECTION, SOLUTION INTRAVENOUS; SUBCUTANEOUS
Status: DISPENSED
Start: 2017-06-10

## (undated) RX ORDER — NICARDIPINE HYDROCHLORIDE 2.5 MG/ML
INJECTION INTRAVENOUS
Status: DISPENSED
Start: 2017-06-10